# Patient Record
Sex: FEMALE | Race: OTHER | NOT HISPANIC OR LATINO | URBAN - METROPOLITAN AREA
[De-identification: names, ages, dates, MRNs, and addresses within clinical notes are randomized per-mention and may not be internally consistent; named-entity substitution may affect disease eponyms.]

---

## 2018-03-05 ENCOUNTER — OUTPATIENT (OUTPATIENT)
Dept: OUTPATIENT SERVICES | Facility: HOSPITAL | Age: 83
LOS: 1 days | Discharge: HOME | End: 2018-03-05

## 2018-07-16 ENCOUNTER — EMERGENCY (EMERGENCY)
Facility: HOSPITAL | Age: 83
LOS: 1 days | Discharge: HOME | End: 2018-07-16
Attending: EMERGENCY MEDICINE | Admitting: EMERGENCY MEDICINE
Payer: MEDICARE

## 2018-07-16 VITALS
WEIGHT: 80.03 LBS | OXYGEN SATURATION: 97 % | TEMPERATURE: 98 F | HEART RATE: 73 BPM | RESPIRATION RATE: 18 BRPM | DIASTOLIC BLOOD PRESSURE: 86 MMHG | HEIGHT: 59 IN | SYSTOLIC BLOOD PRESSURE: 152 MMHG

## 2018-07-16 PROCEDURE — 99283 EMERGENCY DEPT VISIT LOW MDM: CPT

## 2018-07-16 RX ORDER — ACETAMINOPHEN WITH CODEINE 300MG-30MG
1 TABLET ORAL
Qty: 15 | Refills: 0
Start: 2018-07-16

## 2018-07-16 RX ORDER — ACETAMINOPHEN WITH CODEINE 300MG-30MG
1 TABLET ORAL ONCE
Qty: 0 | Refills: 0 | Status: DISCONTINUED | OUTPATIENT
Start: 2018-07-16 | End: 2018-07-16

## 2018-07-16 RX ADMIN — Medication 1 TABLET(S): at 22:18

## 2018-07-16 NOTE — ED PROVIDER NOTE - OBJECTIVE STATEMENT
82 y/o f presents c/o pain and bleeding from gum after having tooth pulled today.  Pt stating she didn't get any pain medication, didn't take anything over the counter.  Pt has been applying pressure to gum and now has stopped bleeding.  Denies fever, chills, all other ROS negative.

## 2018-07-16 NOTE — ED PROVIDER NOTE - MEDICAL DECISION MAKING DETAILS
82 y/o f pain and bleeding after tooth extraction; no active bleeding in ED, given tylenol #3 with improvement, d/c with rx for breakthrough pain, f/u dentist, return to ED if bleeding continues and unable to stop.

## 2018-07-16 NOTE — ED ADULT NURSE NOTE - OBJECTIVE STATEMENT
Pt presents to ED with c/o gum bleeding and severe pain s/p tooth extraction earlier today. Mild bleeding noted. No fever/chills. VSS.

## 2018-07-16 NOTE — ED PROVIDER NOTE - ATTENDING CONTRIBUTION TO CARE
bleeding now controlled, advised to d/c asa for next few days and f/u with dentist tressa. stable for d/c

## 2018-07-20 DIAGNOSIS — Z88.0 ALLERGY STATUS TO PENICILLIN: ICD-10-CM

## 2018-07-20 DIAGNOSIS — Z79.899 OTHER LONG TERM (CURRENT) DRUG THERAPY: ICD-10-CM

## 2018-07-20 DIAGNOSIS — K06.8 OTHER SPECIFIED DISORDERS OF GINGIVA AND EDENTULOUS ALVEOLAR RIDGE: ICD-10-CM

## 2018-07-20 DIAGNOSIS — Z88.8 ALLERGY STATUS TO OTHER DRUGS, MEDICAMENTS AND BIOLOGICAL SUBSTANCES STATUS: ICD-10-CM

## 2018-07-20 DIAGNOSIS — K13.79 OTHER LESIONS OF ORAL MUCOSA: ICD-10-CM

## 2019-03-05 PROBLEM — Z00.00 ENCOUNTER FOR PREVENTIVE HEALTH EXAMINATION: Status: ACTIVE | Noted: 2019-03-05

## 2019-04-15 ENCOUNTER — APPOINTMENT (OUTPATIENT)
Dept: SURGERY | Facility: CLINIC | Age: 84
End: 2019-04-15
Payer: MEDICARE

## 2019-04-15 VITALS
BODY MASS INDEX: 16.15 KG/M2 | TEMPERATURE: 96.3 F | HEART RATE: 74 BPM | DIASTOLIC BLOOD PRESSURE: 84 MMHG | SYSTOLIC BLOOD PRESSURE: 158 MMHG | WEIGHT: 82.25 LBS | OXYGEN SATURATION: 97 % | HEIGHT: 60 IN

## 2019-04-15 DIAGNOSIS — L72.3 SEBACEOUS CYST: ICD-10-CM

## 2019-04-15 PROCEDURE — 99203 OFFICE O/P NEW LOW 30 MIN: CPT

## 2019-04-16 PROBLEM — L72.3 SEBACEOUS CYST: Status: ACTIVE | Noted: 2019-04-16

## 2019-05-06 ENCOUNTER — APPOINTMENT (OUTPATIENT)
Dept: PULMONOLOGY | Facility: CLINIC | Age: 84
End: 2019-05-06
Payer: MEDICARE

## 2019-05-06 VITALS
BODY MASS INDEX: 15.9 KG/M2 | SYSTOLIC BLOOD PRESSURE: 130 MMHG | HEART RATE: 70 BPM | DIASTOLIC BLOOD PRESSURE: 80 MMHG | HEIGHT: 60 IN | OXYGEN SATURATION: 93 % | WEIGHT: 81 LBS | TEMPERATURE: 98.4 F

## 2019-05-06 PROCEDURE — 99204 OFFICE O/P NEW MOD 45 MIN: CPT

## 2019-05-06 NOTE — PHYSICAL EXAM
[General Appearance - Well Developed] : well developed [Normal Appearance] : normal appearance [Well Groomed] : well groomed [General Appearance - Well Nourished] : well nourished [No Deformities] : no deformities [General Appearance - In No Acute Distress] : no acute distress [Normal Conjunctiva] : the conjunctiva exhibited no abnormalities [Eyelids - No Xanthelasma] : the eyelids demonstrated no xanthelasmas [II] : II [Neck Appearance] : the appearance of the neck was normal [Neck Cervical Mass (___cm)] : no neck mass was observed [Thyroid Nodule] : there were no palpable thyroid nodules [Jugular Venous Distention Increased] : there was no jugular-venous distention [Thyroid Diffuse Enlargement] : the thyroid was not enlarged [Heart Rate And Rhythm] : heart rate and rhythm were normal [Murmurs] : no murmurs present [Heart Sounds] : normal S1 and S2 [Respiration, Rhythm And Depth] : normal respiratory rhythm and effort [Exaggerated Use Of Accessory Muscles For Inspiration] : no accessory muscle use [Auscultation Breath Sounds / Voice Sounds] : lungs were clear to auscultation bilaterally [Abnormal Walk] : normal gait [Cyanosis, Localized] : no localized cyanosis [Gait - Sufficient For Exercise Testing] : the gait was sufficient for exercise testing [Nail Clubbing] : no clubbing of the fingernails [Petechial Hemorrhages (___cm)] : no petechial hemorrhages [Skin Turgor] : normal skin turgor [] : no rash [Skin Color & Pigmentation] : normal skin color and pigmentation [Deep Tendon Reflexes (DTR)] : deep tendon reflexes were 2+ and symmetric [Sensation] : the sensory exam was normal to light touch and pinprick [No Focal Deficits] : no focal deficits [Impaired Insight] : insight and judgment were intact [Oriented To Time, Place, And Person] : oriented to person, place, and time [Affect] : the affect was normal

## 2019-05-09 NOTE — END OF VISIT
[>50% of Time Spent on Counseling for ____] : Greater than 50% of the encounter time was spent on counseling for [unfilled] [Time Spent: ___ minutes] : I have spent [unfilled] minutes of face to face time with the patient [FreeTextEntry3] : Suggest she use zolpidem 2.5 mg HS only as needed, tried to emphasize sleep behavior more important than medication, lack of excessive daytime somnolence suggests sleep is adequate.  Will see as needed, return to primary MD

## 2019-05-09 NOTE — HISTORY OF PRESENT ILLNESS
[FreeTextEntry1] : 05/06/2019 :  EMANULE ESPINOZA is a 83 year former smoker female with PMHx HTN and chronic insomnia who is here for the initial visit.\par She stated that had been taking Ambien 5mg qhs  until about 2 years a go. However, her PCP Dr. Chery is uncomfortable filling her medication and now she is having difficulty initiating sleep. \par \par Sleep Routine:\par \par She goes to bed at 12AM, sleep latency is about 30min-1 hour, she does not wakes up during the night and then he is up at 10-11AM. She does not nap. His ESS is 2/24.\par \par off note:\par She used to smoke 1PPD x >50 years. She is now smoking 5 cigarettes/day. She had PFT 2 years go which was "normal".\par \par She reports 6lb weight loss due to living in walk ups on the 5th floor. Otherwise, she denies cataplexy, RLS, parasomnia, or any other sleep behavioral issues.\par \par \par  \par \par \par

## 2019-09-16 ENCOUNTER — EMERGENCY (EMERGENCY)
Facility: HOSPITAL | Age: 84
LOS: 1 days | Discharge: HOME | End: 2019-09-16
Attending: EMERGENCY MEDICINE | Admitting: EMERGENCY MEDICINE
Payer: MEDICARE

## 2019-09-16 VITALS
OXYGEN SATURATION: 96 % | RESPIRATION RATE: 17 BRPM | TEMPERATURE: 98 F | SYSTOLIC BLOOD PRESSURE: 144 MMHG | DIASTOLIC BLOOD PRESSURE: 78 MMHG | HEART RATE: 71 BPM | WEIGHT: 82.01 LBS | HEIGHT: 60 IN

## 2019-09-16 PROCEDURE — 99283 EMERGENCY DEPT VISIT LOW MDM: CPT

## 2019-09-16 RX ORDER — PERMETHRIN CREAM 5% W/W 50 MG/G
1 CREAM TOPICAL
Qty: 1 | Refills: 0
Start: 2019-09-16

## 2019-09-16 NOTE — ED PROVIDER NOTE - PATIENT PORTAL LINK FT
You can access the FollowMyHealth Patient Portal offered by [facility] by registering at the following website: [email or web address]. By joining [employer]’s FollowMyHealth portal, you will also be able to view your health information using other applications (apps) compatible with our system.

## 2019-09-16 NOTE — ED PROVIDER NOTE - OBJECTIVE STATEMENT
Pt is 85yo pmhx HTN presents with 3 weeks of pruritic rash. Rash began three weeks ago, started on upper back, spread to lower back and BL upper arms. Pt reports no obvious lesions other than excoriations from scratching. Denies pain, purulence, fever/chills, N/V, diarrhea, CP, SOB, no recent travel, no shingles vaccine. Prescribed cetrizine by PCP however did not take "because my daughter told me not to."

## 2019-09-16 NOTE — ED ADULT NURSE NOTE - CHPI ED NUR SYMPTOMS NEG
no purulent drainage/no redness/no blood in mucus/no rectal pain/no drainage/no pain/no vomiting/no fever/no chills/no bleeding at site

## 2019-09-16 NOTE — ED PROVIDER NOTE - NSFOLLOWUPINSTRUCTIONS_ED_ALL_ED_FT
Skin care     Image   Apply moisturizing lotion to your skin as needed. Lotion that contains petroleum jelly is best.  Take medicines or apply medicated creams only as told by your health care provider. This may include:  Corticosteroid cream.  Anti-itch lotions.  Oral antihistamines.  Apply a cool, wet cloth (cool compress) to the affected areas.  Take baths with one of the following:  Epsom salts. You can get these at your local pharmacy or grocery store. Follow the instructions on the packaging.  Baking soda. Pour a small amount into the bath as told by your health care provider.  Colloidal oatmeal. You can get this at your local pharmacy or grocery store. Follow the instructions on the packaging.  Apply baking soda paste to your skin. To make the paste, stir water into a small amount of baking soda until it reaches a paste-like consistency.  Do not scratch your skin.  Do not take hot showers or baths, which can make itching worse. A cool shower may help with itching as long as you apply moisturizing lotion after the shower.  Do not use scented soaps, detergents, perfumes, and cosmetic products. Instead, use gentle, unscented versions of these items.  General instructions     Avoid wearing tight clothes.  Keep a journal to help find out what is causing your itching. Write down:  What you eat and drink.  What cosmetic products you use.  What soaps or detergents you use.  What you wear, including jewelry.  Use a humidifier. This keeps the air moist, which helps to prevent dry skin.  Be aware of any changes in your itchiness.  Contact a health care provider if:  The itching does not go away after several days.  You are unusually thirsty or urinating more than normal.  Your skin tingles or feels numb.  Your skin or the white parts of your eyes turn yellow (jaundice).  You feel weak.  You have any of the following:  Night sweats.  Tiredness (fatigue).  Weight loss.  Abdominal pain.  Summary  Pruritus is an itchy feeling on the skin. One of the most common causes is dry skin, but many different conditions and factors can cause itching.  Apply moisturizing lotion to your skin as needed. Lotion that contains petroleum jelly is best.  Take medicines or apply medicated creams only as told by your health care provider.  Do not take hot showers or baths. Do not use scented soaps, detergents, perfumes, or cosmetic products.  This information is not intended to replace advice given to you by your health care provider. Make sure you discuss any questions you have with your health care provider Apply permethrin to your entire body- except your face- leave it for 8 hours- then repeat in 2 weeks    Scabies, Adult  Scabies is a skin condition that happens when very small insects get under the skin (infestation). This causes a rash and severe itchiness. Scabies can spread from person to person (is contagious). If you get scabies, it is common for others in your household to get scabies too.    With proper treatment, symptoms usually go away in 2–4 weeks. Scabies usually does not cause lasting problems.    What are the causes?  This condition is caused by mites (Sarcoptes scabiei, or human itch mites) that can only be seen with a microscope. The mites get into the top layer of skin and lay eggs. Scabies can spread from person to person through:  Close contact with a person who has scabies.  Contact with infested items, such as towels, bedding, or clothing.  What increases the risk?  This condition is more likely to develop in:  People who live in nursing homes and other extended-care facilities.  People who have sexual contact with a partner who has scabies.  Young children who attend  facilities.  People who care for others who are at increased risk for scabies.  What are the signs or symptoms?  Symptoms of this condition may include:  Severe itchiness. This is often worse at night.  A rash that includes tiny red bumps or blisters. The rash commonly occurs on the wrist, elbow, armpit, fingers, waist, groin, or buttocks. Bumps may form a line (burrow) in some areas.  Skin irritation. This can include scaly patches or sores.  How is this diagnosed?  This condition is diagnosed with a physical exam. Your health care provider will look closely at your skin. In some cases, your health care provider may take a sample of your affected skin (skin scraping) and have it examined under a microscope.    How is this treated?  This condition may be treated with:  Medicated cream or lotion that kills the mites. This is spread on the entire body and left on for several hours. Usually, one treatment with medicated cream or lotion is enough to kill all of the mites. In severe cases, the treatment may be repeated.  Medicated cream that relieves itching.  Medicines that help to relieve itching.  Medicines that kill the mites. This treatment is rarely used.  Follow these instructions at home:  Image   Medicines     Take or apply over-the-counter and prescription medicines as told by your health care provider.  Apply medicated cream or lotion as told by your health care provider.  Do not wash off the medicated cream or lotion until the necessary amount of time has passed.  Skin Care     Avoid scratching your affected skin.  Keep your fingernails closely trimmed to reduce injury from scratching.  Take cool baths or apply cool washcloths to help reduce itching.  General instructions     Clean all items that you recently had contact with, including bedding, clothing, and furniture. Do this on the same day that your treatment starts.  Use hot water when you wash items.  Place unwashable items into closed, airtight plastic bags for at least 3 days. The mites cannot live for more than 3 days away from human skin.  Vacuum furniture and mattresses that you use.  Make sure that other people who may have been infested are examined by a health care provider. These include members of your household and anyone who may have had contact with infested items.  Keep all follow-up visits as told by your health care provider. This is important.  Contact a health care provider if:  You have itching that does not go away after 4 weeks of treatment.  You continue to develop new bumps or burrows.  You have redness, swelling, or pain in your rash area after treatment.  You have fluid, blood, or pus coming from your rash.  This information is not intended to replace advice given to you by your health care provider. Make sure you discuss any questions you have with your health care provider.    Document Released: 09/07/2016 Document Revised: 05/25/2017 Document Reviewed: 07/19/2016  [employer] Patient Education © 2019 [employer]

## 2019-09-16 NOTE — ED PROVIDER NOTE - ATTENDING CONTRIBUTION TO CARE
84 F- hx of HTN- co rash to upper back and back of her arms - itchy- has been present for several weeks  no other people w similar rash  no f/c no n/v  vss  s1s2 lungs cta bl  abd soft nt nd +bs  scab like rash to back, several areas of tiny linear scabs  IMP- Chronic rash- scabies like   rx w permethrin x 2

## 2019-09-16 NOTE — ED PROVIDER NOTE - PHYSICAL EXAMINATION
Constitutional - NAD, Comfortable  HEENT - NCAT, EOMI  Neck - Supple, No limited ROM  Chest - CTA bilaterally, no wheezes/rhonchi/rales  Cardio - RRR, normal S1/S2, no murmurs, rubs, or gallops  Abdomen -  Soft, NTND, normoactive BS, no rebound/guarding  Extremities - No C/C/E, no calf tenderness  Neurologic Exam - A&O x3, CN grossly intact  Skin- +Scattered areas of hyperpigmentation with well-healed excoriations predominantly BL shoulders and central back extending from neck to lumbar region; no clear dermatomal distribution  Psychiatric - Mood stable, Affect WNL

## 2019-09-16 NOTE — ED PROVIDER NOTE - NS ED ROS FT
ROS:  Constitutional: Denies fever/chills  HEENT: Negative  Pulm: Denies shortness of breath  Cardio: Denies chest pain  GI:  Denies abdominal pain, N/V, diarrhea/constipation.   : Negative  Back: +BACK AND UPPER ARM ITCHING  Neuro: Denies headaches, numbness/tingling, dizziness  Skin: Negative  Psych:  Denies depression, anxiety

## 2019-09-16 NOTE — ED ADULT TRIAGE NOTE - CHIEF COMPLAINT QUOTE
Patient complaining of rash to back.  Patient denies any fevers, N/V, new soaps, new lotions, new detergents or any other complaints at this time.

## 2019-09-16 NOTE — ED PROVIDER NOTE - CLINICAL SUMMARY MEDICAL DECISION MAKING FREE TEXT BOX
Pt is 85yo F pmhx htn presents with 3 weeks of pruritic rash. Vitals stable. Exam _. Pt is 85yo F pmhx htn presents with 3 weeks of pruritic rash  scab like in appearance- rx with permethrin x 2

## 2019-09-20 DIAGNOSIS — Z79.891 LONG TERM (CURRENT) USE OF OPIATE ANALGESIC: ICD-10-CM

## 2019-09-20 DIAGNOSIS — R21 RASH AND OTHER NONSPECIFIC SKIN ERUPTION: ICD-10-CM

## 2019-09-20 DIAGNOSIS — Z88.0 ALLERGY STATUS TO PENICILLIN: ICD-10-CM

## 2019-09-20 DIAGNOSIS — Z88.8 ALLERGY STATUS TO OTHER DRUGS, MEDICAMENTS AND BIOLOGICAL SUBSTANCES STATUS: ICD-10-CM

## 2019-09-20 DIAGNOSIS — L29.9 PRURITUS, UNSPECIFIED: ICD-10-CM

## 2019-11-20 ENCOUNTER — EMERGENCY (EMERGENCY)
Facility: HOSPITAL | Age: 84
LOS: 1 days | Discharge: HOME | End: 2019-11-20
Attending: EMERGENCY MEDICINE | Admitting: EMERGENCY MEDICINE
Payer: MEDICARE

## 2019-11-20 VITALS
TEMPERATURE: 98 F | HEART RATE: 80 BPM | DIASTOLIC BLOOD PRESSURE: 76 MMHG | SYSTOLIC BLOOD PRESSURE: 151 MMHG | RESPIRATION RATE: 18 BRPM | OXYGEN SATURATION: 94 %

## 2019-11-20 VITALS
RESPIRATION RATE: 16 BRPM | DIASTOLIC BLOOD PRESSURE: 76 MMHG | HEART RATE: 74 BPM | TEMPERATURE: 98 F | OXYGEN SATURATION: 95 % | SYSTOLIC BLOOD PRESSURE: 146 MMHG

## 2019-11-20 PROBLEM — I10 ESSENTIAL (PRIMARY) HYPERTENSION: Chronic | Status: ACTIVE | Noted: 2019-09-16

## 2019-11-20 PROCEDURE — 99283 EMERGENCY DEPT VISIT LOW MDM: CPT

## 2019-11-20 RX ORDER — CYCLOBENZAPRINE HYDROCHLORIDE 10 MG/1
1 TABLET, FILM COATED ORAL
Qty: 14 | Refills: 0
Start: 2019-11-20 | End: 2019-11-26

## 2019-11-20 NOTE — ED ADULT NURSE NOTE - OBJECTIVE STATEMENT
Patient presents to the ED with neck pain.  She states she has been taking tylenol with improvement.  Denies fevers, chills, HA, neuro deficits.  Patient ambulatory with steady gait.

## 2019-11-20 NOTE — ED PROVIDER NOTE - PATIENT PORTAL LINK FT
You can access the FollowMyHealth Patient Portal offered by [facility] by registering at the following website: [email or web address]. By joining [employer]’s FollowMyHealth portal, you will also be able to view your health information using other applications (apps) compatible with our system.

## 2019-11-20 NOTE — ED PROVIDER NOTE - OBJECTIVE STATEMENT
83 y/o F with PMHx of HTN presenting to the ED with complaints of right sided neck pain, stiffness and tightness x 3 weeks. Pt reports that she woke up 3 weeks ago with the pain and symptoms have since persisted. Pt has been taking Tylenol with mild relief of symptoms, but believes the pain occurs because she has not been sleeping properly and is twisting/turning in her sleep. Pt went to her PCP requesting Ambien who said no, and is therefore presenting today requesting an Ambien prescription. Denies any numbness, tingling, headache, dizziness, or any other acute complaints.

## 2019-11-20 NOTE — ED PROVIDER NOTE - ATTENDING CONTRIBUTION TO CARE
83 y/o F with PMHx of HTN presenting to the ED with complaints of right sided neck pain, stiffness and tightness x 3 weeks. Pt reports that she woke up 3 weeks ago with the pain and symptoms have since persisted. Pt has been taking Tylenol with mild relief of symptoms, but believes the pain occurs because she has not been sleeping properly and is twisting/turning in her sleep. Pt went to her PCP requesting Ambien who said no, and is therefore presenting today requesting an Ambien prescription. Denies any numbness, tingling, headache, dizziness, or any other acute complaints.    Agree with HPI and PE as documented by PA  Pt in no acute distress  Pt with pain upon movement   No focal midline tenderness  Pt requesting ambien but do not recommend  Will give small dose of flexeril  Very well appearing in ED

## 2019-11-20 NOTE — ED PROVIDER NOTE - MUSCULOSKELETAL, MLM
Mild tenderness to right side of neck. No swelling, no skin discoloration, no midline cervical neck tenderness. FROM of head and neck. Strength 5/5 to b/l UE. Distal sensations intact.

## 2019-11-20 NOTE — ED PROVIDER NOTE - CLINICAL SUMMARY MEDICAL DECISION MAKING FREE TEXT BOX
83 y/o F with PMHx of HTN presenting with 3 weeks of right sided neck stiffness and discomfort. Symptoms have been improving although pt is still having some pain, thus presenting to ED. Pt also asking for presciption for Ambien which her PMD wouldn't give her. Pt is well-appearing, no neuro deficits. Likely torticollis, possible muscle spasm. Not concerned for vascular etiology. Will give prescription for Flexeril, pt can continue taking Tylenol. Pt to f/u with PMD for possible PT if symptoms persist. Discussed with pt that she will not be given a prescription for Ambien from the ER.

## 2019-11-20 NOTE — ED PROVIDER NOTE - NSFOLLOWUPINSTRUCTIONS_ED_ALL_ED_FT
Acute Neck Pain    WHAT YOU NEED TO KNOW:    Acute neck pain starts suddenly, increases quickly, and goes away in a few days. The pain may come and go, or be worse with certain movements. The pain may be only in your neck, or it may move to your arms, back, or shoulders. You may also have pain that starts in another body area and moves to your neck. Vertebral Column         DISCHARGE INSTRUCTIONS:    Return to the emergency department if:     You have an injury that causes neck pain and shooting pain down your arms or legs.      Your neck pain suddenly becomes severe.      You have neck pain along with numbness, tingling, or weakness in your arms or legs.      You have a stiff neck, a headache, and a fever.    Contact your healthcare provider if:     You have new or worsening symptoms.      Your symptoms continue even after treatment.      You have questions or concerns about your condition or care.    Medicines:     NSAIDs, such as ibuprofen, help decrease swelling, pain, and fever. This medicine is available without a doctor's order. Ask your healthcare provider which medicine to take and how often to take it. Follow directions. NSAIDs can cause stomach bleeding or kidney problems if not taken correctly. If you take blood thinner medicine, always ask if NSAIDs are safe for you.      Acetaminophen helps decrease pain and fever. Ask your healthcare provider how much to take and how often to take it. Follow directions. Acetaminophen can cause liver damage if not taken correctly.      Steroid medicine may be used to reduce inflammation. This can help relieve pain caused by swelling.      Take your medicine as directed. Contact your healthcare provider if you think your medicine is not helping or if you have side effects. Tell him or her if you are allergic to any medicine. Keep a list of the medicines, vitamins, and herbs you take. Include the amounts, and when and why you take them. Bring the list or the pill bottles to follow-up visits. Carry your medicine list with you in case of an emergency.    Manage or prevent acute neck pain:     Rest your neck as directed. Do not make sudden movements, such as turning your head quickly. Your healthcare provider may recommend you wear a cervical collar for a short time. The collar will prevent you from moving your head. This will give your neck time to heal if an injury is causing your neck pain. Ask your healthcare provider when you can return to sports or other normal daily activities.      Apply heat as directed. Heat helps relieve pain and swelling. Use a heat wrap, or soak a small towel in warm water. Wring out the extra water. Apply the heat wrap or towel for 20 minutes every hour, or as directed.      Apply ice as directed. Ice helps relieve pain and swelling, and can help prevent tissue damage. Use an ice pack, or put ice in a bag. Cover the ice pack or back with a towel before you apply it to your neck. Apply the ice pack or ice for 15 minutes every hour, or as directed. Your healthcare provider can tell you how often to apply ice.      Do neck exercises as directed. Neck exercises help strengthen the muscles and increase range of motion. Your healthcare provider will tell you which exercises are right for you. He may give you instructions, or he may recommend that you work with a physical therapist. Your healthcare provider or therapist can make sure you are doing the exercises correctly.       Maintain good posture. Try to keep your head and shoulders lifted when you sit. If you work in front of a computer, make sure the monitor is at the right level. You should not need to look up down to see the screen. You should also not have to lean forward to be able to read what is on the screen. Make sure your keyboard, mouse, and other computer items are placed where you do not have to extend your shoulder to reach them. Get up often if you work in front of a computer or sit for long periods of time. Stretch or walk around to keep your neck muscles loose.    Follow up with your healthcare provider as directed: Your healthcare provider may refer you to a specialist if your pain does not get better with treatment. Write down your questions so you remember to ask them during your visits.

## 2019-11-20 NOTE — ED ADULT TRIAGE NOTE - CHIEF COMPLAINT QUOTE
pt c/o R sided neck pain for 3 weeks. denies trauma or injury. denies numbness/tingling, headaches, chest pain, sob.

## 2019-11-25 DIAGNOSIS — M54.2 CERVICALGIA: ICD-10-CM

## 2019-11-25 DIAGNOSIS — M43.6 TORTICOLLIS: ICD-10-CM

## 2019-12-09 ENCOUNTER — EMERGENCY (EMERGENCY)
Facility: HOSPITAL | Age: 84
LOS: 1 days | Discharge: HOME | End: 2019-12-09
Attending: EMERGENCY MEDICINE | Admitting: EMERGENCY MEDICINE
Payer: MEDICARE

## 2019-12-09 VITALS
SYSTOLIC BLOOD PRESSURE: 132 MMHG | OXYGEN SATURATION: 95 % | HEIGHT: 60 IN | RESPIRATION RATE: 18 BRPM | WEIGHT: 84 LBS | TEMPERATURE: 98 F | HEART RATE: 80 BPM | DIASTOLIC BLOOD PRESSURE: 73 MMHG

## 2019-12-09 PROCEDURE — 72125 CT NECK SPINE W/O DYE: CPT | Mod: 26

## 2019-12-09 PROCEDURE — 99284 EMERGENCY DEPT VISIT MOD MDM: CPT | Mod: 25

## 2019-12-09 PROCEDURE — 99284 EMERGENCY DEPT VISIT MOD MDM: CPT

## 2019-12-09 PROCEDURE — 72125 CT NECK SPINE W/O DYE: CPT

## 2019-12-09 RX ORDER — ACETAMINOPHEN 500 MG
650 TABLET ORAL ONCE
Refills: 0 | Status: COMPLETED | OUTPATIENT
Start: 2019-12-09 | End: 2019-12-09

## 2019-12-09 RX ADMIN — Medication 650 MILLIGRAM(S): at 16:00

## 2019-12-09 RX ADMIN — Medication 650 MILLIGRAM(S): at 15:30

## 2019-12-09 NOTE — ED PROVIDER NOTE - CARE PROVIDER_API CALL
Dom George)  Internal Medicine  [address] [place] [address]  Phone: [phone]  Fax: [phone]  Follow Up Time:

## 2019-12-09 NOTE — ED PROVIDER NOTE - CPE EDP CARDIAC NORM
From: Gina L Behr  To: Gregoria Grey MD  Sent: 10/16/2017 10:19 AM CDT  Subject: Refill Oketo Thyroid    Hi Dr. Grey,    Endocrinologist was referred to my by my old PCP. You have offered to manage my hypothyroidism in the past. The [facility] specialist has since left [place]. I will be needing a refill on Oketo in about a month. Please let me know if you'd like labs appointment or both. I work at [facility] so labs are easy to do here.     Thank you,    Gina Behr   [phone] and leaving a detailed message on this voicemail is fine.   normal...

## 2019-12-09 NOTE — ED PROVIDER NOTE - MUSCULOSKELETAL NEGATIVE STATEMENT, MLM
Post renal transplant day 0  Plan  Monitor urine out put, electrolytes, blood pressure  reviewed medications, lab data  I was present during and reviewed clinical and lab data as well as assessment and plan as documented by the house staff as noted. Please contact if any additional questions with any change in clinical condition or on availability of any additional information or reports.  Omar Cortes MD  [phone] no back pain, no gout, no musculoskeletal pain, + neck pain, and no weakness.

## 2019-12-09 NOTE — ED PROVIDER NOTE - NEUROLOGICAL, MLM
Alert and oriented, no focal deficits, no motor or sensory deficits.  5/5 strength to bilateral UEs, sensation intact and symmetric to bilateral UEs.

## 2019-12-09 NOTE — ED ADULT NURSE NOTE - OBJECTIVE STATEMENT
Pt presents complaining of neck pain and stiffness since late november. Pt reports she was seen in [facility] ED and was sent home with cyclobenzaprine which helped but she has now run out of pills and does not have a primary care doctor to follow up with for a refill. Pt reports she cannot turn head side to side, elicits tenderness on palpation of neck. Pt reports no fevers/chills, no headache, pt demonstrates ability to touch chin to chest.

## 2019-12-09 NOTE — ED ADULT NURSE NOTE - NSIMPLEMENTINTERV_GEN_ALL_ED
Implemented All Fall with Harm Risk Interventions:  [place] to call system. Call bell, personal items and telephone within reach. Instruct patient to call for assistance. Room bathroom lighting operational. Non-slip footwear when patient is off stretcher. Physically safe environment: no spills, clutter or unnecessary equipment. Stretcher in lowest position, wheels locked, appropriate side rails in place. Provide visual cue, wrist band, yellow gown, etc. Monitor gait and stability. Monitor for mental status changes and reorient to person, place, and time. Review medications for side effects contributing to fall risk. Reinforce activity limits and safety measures with patient and family. Provide visual clues: red socks.

## 2019-12-09 NOTE — ED PROVIDER NOTE - NSFOLLOWUPINSTRUCTIONS_ED_ALL_ED_FT
Please follow up with your primary physician in 1-2 days for re evaluation.  Please return to ER immediately should your symptoms worsen or if you have any concern prior to this recommended follow up.    Cervical Strain    WHAT YOU NEED TO KNOW:    A cervical strain is a stretched or torn muscle or tendon in your neck. Tendons are strong tissues that connect muscles to bones.    DISCHARGE INSTRUCTIONS:    Return to the emergency department if:     You have pain or numbness from your shoulder down to your hand.      You have problems with your vision, hearing, or balance.      You feel confused or cannot concentrate.      You have problems with movement and strength.    Call your doctor if:     You have increased swelling or pain in your neck.       You have questions or concerns about your condition or care.    Medicines: You may need any of the following:     Acetaminophen decreases pain and fever. It is available without a doctor's order. Ask how much to take and how often to take it. Follow directions. Read the labels of all other medicines you are using to see if they also contain acetaminophen, or ask your doctor or pharmacist. Acetaminophen can cause liver damage if not taken correctly. Do not use more than 4 grams (4,000 milligrams) total of acetaminophen in one day.       NSAIDs, such as ibuprofen, help decrease swelling, pain, and fever. This medicine is available with or without a doctor's order. NSAIDs can cause stomach bleeding or kidney problems in certain people. If you take blood thinner medicine, always ask your healthcare provider if NSAIDs are safe for you. Always read the medicine label and follow directions.      Muscle relaxers help decrease pain and muscle spasms.      Prescription pain medicine may be given. Ask your healthcare provider how to take this medicine safely. Some prescription pain medicines contain acetaminophen. Do not take other medicines that contain acetaminophen without talking to your healthcare provider. Too much acetaminophen may cause liver damage. Prescription pain medicine may cause constipation. Ask your healthcare provider how to prevent or treat constipation.       Take your medicine as directed. Contact your healthcare provider if you think your medicine is not helping or if you have side effects. Tell him or her if you are allergic to any medicine. Keep a list of the medicines, vitamins, and herbs you take. Include the amounts, and when and why you take them. Bring the list or the pill bottles to follow-up visits. Carry your medicine list with you in case of an emergency.    Manage your symptoms:     Apply heat on your neck for 15 to 20 minutes, 4 to 6 times a day or as directed. Heat helps decrease pain, stiffness, and muscle spasms.      Begin gentle neck exercises as soon as you can move your neck without pain. Exercises will help decrease stiffness and improve the strength and movement of your neck. Ask your healthcare provider what kind of exercises you should do.      Gradually return to your usual activities as directed. Stop if you have pain. Avoid activities that can cause more damage to your neck, such as heavy lifting or strenuous exercise.      Sleep without a pillow to help decrease pain. Instead, roll a small towel tightly and place it under your neck.       Go to physical therapy as directed. A physical therapist teaches you exercises to help improve movement and strength, and to decrease pain.     Prevent another neck injury:     Drive safely. Make sure everyone in your car wears a seatbelt. A seatbelt can save your life if you are in an accident. Do not use your cell phone when you are driving. This could distract you and cause an accident. Pull over if you need to make a call or send a text message.       Wear helmets, lifejackets, and protective gear. Always wear a helmet when you ride a bike or motorcycle, go skiing, or play sports that could cause a head injury. Wear protective equipment when you play sports. Wear a lifejacket when you are on a boat or doing water sports.     Follow up with your doctor as directed: You may be referred to an orthopedist or physical therapies. Write down your questions so you remember to ask them during your visits.        © Copyright "[employer] 2019       back to top                      © Copyright "[employer] 2019

## 2019-12-09 NOTE — ED PROVIDER NOTE - PATIENT PORTAL LINK FT
You can access the FollowMyHealth Patient Portal offered by [facility] by registering at the following website: [email or web address]. By joining [employer]’s FollowMyHealth portal, you will also be able to view your health information using other applications (apps) compatible with our system.

## 2019-12-09 NOTE — ED ADULT NURSE NOTE - CHPI ED NUR SYMPTOMS NEG
no bowel dysfunction/no motor function loss/no tingling/no anorexia/no fatigue/no difficulty bearing weight/no bladder dysfunction/no numbness/no constipation

## 2019-12-09 NOTE — ED PROVIDER NOTE - CLINICAL SUMMARY MEDICAL DECISION MAKING FREE TEXT BOX
Patient in ED w concern for ongoing neck pain.  Patient well appearing, no midline spine pain on exam, + reproducible ttp over bilateral paracervical musculature.  Given tylenol in ED and CT cervical spine completed and results reviewed.  Patient notes improvement in her pain with tylenol.  She is given name/info for another PCP at her request and is advised to take tylenol and follow up with primary team in 2-3 days for re eval.  She is instructed to return to ED immediately should her symptoms worsen or if she has any concern prior to this recommended follow up.  Patient is aware of plan and verbalizes her understanding.  Will discharge home at this time.

## 2019-12-09 NOTE — ED ADULT TRIAGE NOTE - CHIEF COMPLAINT QUOTE
Pt CO Neck pain and subsequent limited ROM.  Pt states "I was evaluated here on 11/20 and they sent a Rx to the pharmacy but they wouldn't give it to me."

## 2019-12-09 NOTE — ED PROVIDER NOTE - MUSCULOSKELETAL, MLM
Spine appears normal, there is no midline cervical spine pain/tenderness/stepoff/deformity.  range of motion is not limited, no muscle or joint tenderness

## 2019-12-15 DIAGNOSIS — M54.2 CERVICALGIA: ICD-10-CM

## 2020-02-24 ENCOUNTER — EMERGENCY (EMERGENCY)
Facility: HOSPITAL | Age: 85
LOS: 1 days | Discharge: HOME | End: 2020-02-24
Admitting: EMERGENCY MEDICINE
Payer: MEDICARE

## 2020-02-24 VITALS
HEIGHT: 60 IN | SYSTOLIC BLOOD PRESSURE: 112 MMHG | DIASTOLIC BLOOD PRESSURE: 70 MMHG | RESPIRATION RATE: 18 BRPM | WEIGHT: 84 LBS | TEMPERATURE: 98 F | HEART RATE: 87 BPM | OXYGEN SATURATION: 96 %

## 2020-02-24 PROCEDURE — 28490 TREAT BIG TOE FRACTURE: CPT | Mod: 54,T5

## 2020-02-24 PROCEDURE — 28490 TREAT BIG TOE FRACTURE: CPT | Mod: T5

## 2020-02-24 PROCEDURE — 99284 EMERGENCY DEPT VISIT MOD MDM: CPT | Mod: 57

## 2020-02-24 PROCEDURE — 99283 EMERGENCY DEPT VISIT LOW MDM: CPT | Mod: 25

## 2020-02-24 PROCEDURE — 73630 X-RAY EXAM OF FOOT: CPT | Mod: 26,RT

## 2020-02-24 PROCEDURE — 73630 X-RAY EXAM OF FOOT: CPT

## 2020-02-24 RX ORDER — IBUPROFEN 200 MG
600 TABLET ORAL ONCE
Refills: 0 | Status: COMPLETED | OUTPATIENT
Start: 2020-02-24 | End: 2020-02-24

## 2020-02-24 RX ADMIN — Medication 600 MILLIGRAM(S): at 22:12

## 2020-02-24 RX ADMIN — Medication 600 MILLIGRAM(S): at 22:45

## 2020-02-24 NOTE — ED PROVIDER NOTE - CLINICAL SUMMARY MEDICAL DECISION MAKING FREE TEXT BOX
85 y/o female with right toe pain s/p fall x1 week. Consider fx/dislocation. Soft compartment. NVI. no skin breaks. Xray + for fx. Placed preston splint with post op shoe. Advised follow-up with Podiatry for repeat xray in one week to ensure healing.

## 2020-02-24 NOTE — ED PROVIDER NOTE - OBJECTIVE STATEMENT
85 y/o female present with right toe pain x1 week. Pt reports she fell and hit her right toe. She has noticed swelling and bruising however has improved. She has been taking tylenol for the pain. Despite the pain she has been able to walk. She denies the following: numbness/tingling, skin breaks.

## 2020-02-24 NOTE — ED PROVIDER NOTE - NSFOLLOWUPINSTRUCTIONS_ED_ALL_ED_FT
You have been diagnosed with a fracture located on your right toe. For the next 4-8 weeks please continue wrapping your toe with self-adhesive wrap as discussed and wear a post op shoe or a short cam boot. Please follow up with a podiatrist in one week for repeat exam and xray. Return to the Emergency Department if you have any new or worsening symptoms, or if you have any concerns.    Toe Fracture  A toe fracture is a break in one of the toe bones (phalanges). A toe fracture may be:  A crack in the surface of the bone (stress fracture). This often occurs in athletes.A break all the way through the bone (complete fracture).What are the causes?  This condition may be caused by:  Direct impact, such as from dropping a heavy object on your toe.Stubbing your toe.Twisting or stretching your toe out of place.Overuse or repetitive exercise.What increases the risk?  You are more likely to develop this condition if you:  Play contact sports.Have a condition that causes the bones to become thin and brittle (osteoporosis).Have a low calcium level.What are the signs or symptoms?  The main symptoms of this condition are swelling and pain in the toe. Other symptoms include:  Bruising.Stiffness.Numbness.A change in the way the toe looks.Broken bones that poke through the skin.Blood beneath the toenail.How is this diagnosed?  This condition is diagnosed with a physical exam. You may also have X-rays.  How is this treated?  Treatment for this condition depends on the type of fracture and its severity. Treatment may include:  Taping the broken toe to a toe that is next to it (preston taping). This is the most common treatment for fractures in which the bone has not moved out of place (non-displaced fracture).Wearing a shoe that has a wide, rigid sole to protect the toe and to limit its movement.Wearing a walking cast.Having a procedure to move the toe back into place.Surgery. This may be needed if the:  Pieces of broken bone are out of place (displaced).Bone breaks through the skin.Physical therapy. This is done to help regain movement and strength in the toe.You may need follow-up X-rays to make sure that the bone is healing well and staying in position.  Follow these instructions at home:  If you have a shoe:     Wear the shoe as told by your health care provider. Remove it only as told by your health care provider. Loosen the shoe if your toes tingle, become numb, or turn cold and blue.Keep the shoe clean and dry.If you have a cast:     Do not put pressure on any part of the cast until it is fully hardened. This may take several hours.Do not stick anything inside the cast to scratch your skin. Doing that increases your risk of infection.Check the skin around the cast every day. Tell your health care provider about any concerns. You may put lotion on dry skin around the edges of the cast. Do not put lotion on the skin underneath the cast. Keep the cast clean and dry.Bathing     Do not take baths, swim, or use a hot tub until your health care provider approves. Ask your health care provider if you can take showers.If the shoe or cast is not waterproof:  Do not let it get wet. Cover it with a watertight covering when you take a bath or a shower.Activity     Do not use the injured foot to support your body weight until your health care provider says that you can. Use crutches as directed.Ask your health care provider:  What activities are safe for you during recovery.What activities you need to avoid.Do physical therapy exercises as directed.Driving     Do not drive or use heavy machinery while taking pain medicine.Do not drive while wearing a cast on a foot that you use for driving.Managing pain, stiffness, and swelling        If directed, put ice on painful areas:  Put ice in a plastic bag.Place a towel between your skin and the bag.  If you have a shoe, remove it as told by your health care provider.If you have a cast, place a towel between your cast and the bag.Leave the ice on for 20 minutes, 2–3 times per day.Raise (elevate) the injured area above the level of your heart while you are sitting or lying down.General instructions     If your toe was treated with preston taping, follow your health care provider's instructions for changing the gauze and tape. Change it more often if:  The gauze and tape get wet. If this happens, dry the space between the toes.The gauze and tape are too tight and cause your toe to become pale or numb.If you were not given a protective shoe, wear sturdy, supportive shoes. Your shoes should not pinch your toes and should not fit tightly against your toes.Do not use any products that contain nicotine or tobacco, such as cigarettes and e-cigarettes. These can delay bone healing. If you need help quitting, ask your health care provider.Take over-the-counter and prescription medicines only as told by your health care provider.Keep all follow-up visits as told by your health care provider. This is important.Contact a health care provider if you have:  Pain that gets worse or does not get better with medicine.A fever.A bad smell coming from your cast.Get help right away if you have:  Any of the following in your toes or your foot:  Numbness that gets worse.Tingling.Coldness.Blue skin.Redness or swelling that gets worse.Pain that suddenly becomes severe.Summary  A toe fracture is a break in one of the toe bones (phalanges).Treatment depends on how severe your fracture is and how the pieces of the broken bone line up with each other. Treatment may include preston taping, wearing a shoe or a cast, or using crutches.Ice and elevate your foot to help lessen the pain and swelling.This information is not intended to replace advice given to you by your health care provider. Make sure you discuss any questions you have with your health care provider.    Document Released: 12/15/2001 Document Revised: 04/02/2019 Document Reviewed: 01/29/2019  [employer] Interactive Patient Education © 2020 [employer]

## 2020-02-24 NOTE — ED PROVIDER NOTE - LOWER EXTREMITY EXAM, RIGHT
ECCHYMOSIS, TTP ALONG MEDIAL 1ST TOE WITH DECREASED ROM, SENSATION AND GOOD CAP REFILL INTACT WITHOUT SKIN BREAKS/TENDERNESS/BRUISING/SWELLING

## 2020-02-24 NOTE — ED PROVIDER NOTE - PATIENT PORTAL LINK FT
You can access the FollowMyHealth Patient Portal offered by [facility] by registering at the following website: [email or web address]. By joining [facility]’s FollowMyHealth portal, you will also be able to view your health information using other applications (apps) compatible with our system.

## 2020-02-24 NOTE — ED ADULT TRIAGE NOTE - CHIEF COMPLAINT QUOTE
presents to ED for right foot pain s/p fall last Thursday evening while in her home.  patient reports it was a mechanical fall while walking in the dark at home.

## 2020-02-24 NOTE — ED ADULT NURSE NOTE - CHPI ED NUR SYMPTOMS NEG
no deformity/no bleeding/no abrasion/no vomiting/no loss of consciousness/no confusion/no tingling/no weakness/no numbness/no fever

## 2020-02-24 NOTE — ED ADULT TRIAGE NOTE - PAIN: PRESENCE, MLM
PT DAILY TREATMENT NOTE     Patient Name: Ni Mathis  Date:2018  : 1963  [x]  Patient  Verified  Payor: Casandra Christina / Plan[facility] [place] [employer]  [facility] Product Type: Commerical /    In time:3:00  Out time: 3:48  Total Treatment Time (min): 48  Visit #: 5 of     Treatment Area: Pain in right ankle and joints of right foot [M25.571]  Unspecified fracture of shaft of right tibia, initial encounter for closed fracture [S82.201A]    SUBJECTIVE  Pain Level (0-10 scale): 4  Any medication changes, allergies to medications, adverse drug reactions, diagnosis change, or new procedure performed?: [x] No    [] Yes (see summary sheet for update)  Subjective functional status/changes:   [x] No changes reported      OBJECTIVE    28 min Therapeutic Exercise:  [x] See flow sheet :   Rationale: increase ROM and increase strength to improve the patients ability to improve ease of ADLs. 10 min Neuromuscular Re-education:  [x]  See flow sheet :   Rationale: increase strength, improve coordination and increase proprioception  to improve the patients ability to improve stability during ambulatory activity. 10 min Manual Therapy:  R talocrural joint mobilizations to improve DF and PF grade 2-4, PF and DF manual stretch, [place] to R gastroc and achilles    Rationale: decrease pain, increase ROM and increase tissue extensibility to improve functional ankle mobility to improve ease of ambulation          With   [] TE   [] TA   [] neuro   [] other: Patient Education: [x] Review HEP    [] Progressed/Changed HEP based on:   [] positioning   [] body mechanics   [] transfers   [] heat/ice application    [] other:      Other Objective/Functional Measures: increased TM speed slightly    Pain Level (0-10 scale) post treatment: 4    ASSESSMENT/Changes in Function:  Slow progress with PT.      Patient will continue to benefit from skilled PT services to modify and progress therapeutic interventions, address functional mobility deficits, address ROM deficits, address strength deficits, analyze and address soft tissue restrictions and analyze and cue movement patterns to attain remaining goals. []  See Plan of Care  []  See progress note/recertification  []  See Discharge Summary         Progress towards goals / Updated goals:  Short Term Goals: To be accomplished in 2 weeks:                         1. Patient will report performance of HEP at least 2 times per day to facilitate improved outcomes and improved self management. Pt reports compliance with HEP 2/12/2018                         2. Pt will demonstrate PROM R ankle DF to 5 degrees or better to improve gait mechanics. Not met -1 degrees from neutral 2/12/2018  Long Term Goals: To be accomplished in 6 weeks:                         1. Pt will demonstrate R ankle AROM DF to 8 degrees, Inv to 15 degrees, and PF to 45 or better to improve gait mechanics and ease of functional mobility.                        0. Pt will demonstrate gait void of compensatory hip movement to reduce hip discomfort with ADLs. Not met 2/12/2018                         3. Pt will demonstrate 4/5 global R ankle strength to improve ease of ADLs.                          3. Pt will demonstrate ability to perform eccentric step down on 4 inch step void of compensation to indicate improving functional utilization of ankle mobility during daily     PLAN  []  Upgrade activities as tolerated     [x]  Continue plan of care  []  Update interventions per flow sheet       []  Discharge due to:_  []  Other:_      Tg Singh, PT  2/16/2018  3:05 PM    Future Appointments  Date Time Provider Monique Bullard   2[facility]16/2018 3:00 PM [facility]   2/19/2018 12:00 PM [facility]   2/20/2018 1:40 PM MD Lorrie Krishnamurthy   2/26/2018 5:30 PM [facility]   3/2/2018 2:00 PM [facility]zymanski [facility] complains of pain/discomfort

## 2020-02-29 DIAGNOSIS — S90.111A CONTUSION OF RIGHT GREAT TOE WITHOUT DAMAGE TO NAIL, INITIAL ENCOUNTER: ICD-10-CM

## 2020-02-29 DIAGNOSIS — M79.671 PAIN IN RIGHT FOOT: ICD-10-CM
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
## 2020-02-29 DIAGNOSIS — S92.411A DISPLACED FRACTURE OF PROXIMAL PHALANX OF RIGHT GREAT TOE, INITIAL ENCOUNTER FOR CLOSED FRACTURE: ICD-10-CM

## 2020-02-29 DIAGNOSIS — Z88.0 ALLERGY STATUS TO PENICILLIN: ICD-10-CM

## 2020-02-29 DIAGNOSIS — Z88.6 ALLERGY STATUS TO ANALGESIC AGENT: ICD-10-CM
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
## 2020-04-08 ENCOUNTER — EMERGENCY (EMERGENCY)
Facility: HOSPITAL | Age: 85
LOS: 1 days | Discharge: HOME | End: 2020-04-08
Attending: EMERGENCY MEDICINE | Admitting: EMERGENCY MEDICINE
Payer: MEDICARE

## 2020-04-08 VITALS
RESPIRATION RATE: 24 BRPM | HEART RATE: 59 BPM | DIASTOLIC BLOOD PRESSURE: 70 MMHG | SYSTOLIC BLOOD PRESSURE: 129 MMHG | OXYGEN SATURATION: 99 % | TEMPERATURE: 98 F

## 2020-04-08 VITALS
HEART RATE: 75 BPM | TEMPERATURE: 98 F | DIASTOLIC BLOOD PRESSURE: 69 MMHG | RESPIRATION RATE: 26 BRPM | SYSTOLIC BLOOD PRESSURE: 117 MMHG | OXYGEN SATURATION: 94 %

## 2020-04-08 DIAGNOSIS — Z98.890 OTHER SPECIFIED POSTPROCEDURAL STATES: Chronic | ICD-10-CM

## 2020-04-08 LAB
ALBUMIN SERPL ELPH-MCNC: 3.6 G/DL — SIGNIFICANT CHANGE UP (ref 3.3–5)
ALP SERPL-CCNC: 89 U/L — SIGNIFICANT CHANGE UP (ref 40–120)
ALT FLD-CCNC: 18 U/L — SIGNIFICANT CHANGE UP (ref 10–45)
ANION GAP SERPL CALC-SCNC: 14 MMOL/L — SIGNIFICANT CHANGE UP (ref 5–17)
APTT BLD: 29.1 SEC — SIGNIFICANT CHANGE UP (ref 27.5–36.3)
AST SERPL-CCNC: 32 U/L — SIGNIFICANT CHANGE UP (ref 10–40)
BASOPHILS # BLD AUTO: 0 K/UL — SIGNIFICANT CHANGE UP (ref 0–0.2)
BASOPHILS NFR BLD AUTO: 0 % — SIGNIFICANT CHANGE UP (ref 0–2)
BILIRUB SERPL-MCNC: 0.3 MG/DL — SIGNIFICANT CHANGE UP (ref 0.2–1.2)
BUN SERPL-MCNC: 25 MG/DL — HIGH (ref 7–23)
CALCIUM SERPL-MCNC: 9 MG/DL — SIGNIFICANT CHANGE UP (ref 8.4–10.5)
CHLORIDE SERPL-SCNC: 106 MMOL/L — SIGNIFICANT CHANGE UP (ref 96–108)
CO2 SERPL-SCNC: 26 MMOL/L — SIGNIFICANT CHANGE UP (ref 22–31)
CREAT SERPL-MCNC: 0.74 MG/DL — SIGNIFICANT CHANGE UP (ref 0.5–1.3)
DACRYOCYTES BLD QL SMEAR: SLIGHT — SIGNIFICANT CHANGE UP
ELLIPTOCYTES BLD QL SMEAR: SLIGHT — SIGNIFICANT CHANGE UP
EOSINOPHIL # BLD AUTO: 0 K/UL — SIGNIFICANT CHANGE UP (ref 0–0.5)
EOSINOPHIL NFR BLD AUTO: 0 % — SIGNIFICANT CHANGE UP (ref 0–6)
GIANT PLATELETS BLD QL SMEAR: PRESENT — SIGNIFICANT CHANGE UP
GLUCOSE SERPL-MCNC: 88 MG/DL — SIGNIFICANT CHANGE UP (ref 70–99)
HCT VFR BLD CALC: 41.1 % — SIGNIFICANT CHANGE UP (ref 34.5–45)
HGB BLD-MCNC: 13.1 G/DL — SIGNIFICANT CHANGE UP (ref 11.5–15.5)
INR BLD: 1.04 — SIGNIFICANT CHANGE UP (ref 0.88–1.16)
LYMPHOCYTES # BLD AUTO: 0.46 K/UL — LOW (ref 1–3.3)
LYMPHOCYTES # BLD AUTO: 9.7 % — LOW (ref 13–44)
MANUAL SMEAR VERIFICATION: SIGNIFICANT CHANGE UP
MCHC RBC-ENTMCNC: 28.3 PG — SIGNIFICANT CHANGE UP (ref 27–34)
MCHC RBC-ENTMCNC: 31.9 GM/DL — LOW (ref 32–36)
MCV RBC AUTO: 88.8 FL — SIGNIFICANT CHANGE UP (ref 80–100)
MONOCYTES # BLD AUTO: 0.38 K/UL — SIGNIFICANT CHANGE UP (ref 0–0.9)
MONOCYTES NFR BLD AUTO: 8 % — SIGNIFICANT CHANGE UP (ref 2–14)
NEUTROPHILS # BLD AUTO: 3.75 K/UL — SIGNIFICANT CHANGE UP (ref 1.8–7.4)
NEUTROPHILS NFR BLD AUTO: 77 % — SIGNIFICANT CHANGE UP (ref 43–77)
NEUTS BAND # BLD: 1.8 % — SIGNIFICANT CHANGE UP (ref 0–8)
OVALOCYTES BLD QL SMEAR: SLIGHT — SIGNIFICANT CHANGE UP
PLAT MORPH BLD: NORMAL — SIGNIFICANT CHANGE UP
PLATELET # BLD AUTO: 237 K/UL — SIGNIFICANT CHANGE UP (ref 150–400)
POTASSIUM SERPL-MCNC: 4.2 MMOL/L — SIGNIFICANT CHANGE UP (ref 3.5–5.3)
POTASSIUM SERPL-SCNC: 4.2 MMOL/L — SIGNIFICANT CHANGE UP (ref 3.5–5.3)
PROT SERPL-MCNC: 7.2 G/DL — SIGNIFICANT CHANGE UP (ref 6–8.3)
PROTHROM AB SERPL-ACNC: 11.9 SEC — SIGNIFICANT CHANGE UP (ref 10–12.9)
RBC # BLD: 4.63 M/UL — SIGNIFICANT CHANGE UP (ref 3.8–5.2)
RBC # FLD: 13.6 % — SIGNIFICANT CHANGE UP (ref 10.3–14.5)
RBC BLD AUTO: ABNORMAL
SARS-COV-2 RNA SPEC QL NAA+PROBE: SIGNIFICANT CHANGE UP
SODIUM SERPL-SCNC: 146 MMOL/L — HIGH (ref 135–145)
VARIANT LYMPHS # BLD: 3.5 % — SIGNIFICANT CHANGE UP (ref 0–6)
WBC # BLD: 4.76 K/UL — SIGNIFICANT CHANGE UP (ref 3.8–10.5)
WBC # FLD AUTO: 4.76 K/UL — SIGNIFICANT CHANGE UP (ref 3.8–10.5)

## 2020-04-08 PROCEDURE — 71045 X-RAY EXAM CHEST 1 VIEW: CPT | Mod: 26

## 2020-04-08 PROCEDURE — 71045 X-RAY EXAM CHEST 1 VIEW: CPT

## 2020-04-08 PROCEDURE — 85730 THROMBOPLASTIN TIME PARTIAL: CPT

## 2020-04-08 PROCEDURE — 99283 EMERGENCY DEPT VISIT LOW MDM: CPT

## 2020-04-08 PROCEDURE — 85610 PROTHROMBIN TIME: CPT

## 2020-04-08 PROCEDURE — 36415 COLL VENOUS BLD VENIPUNCTURE: CPT

## 2020-04-08 PROCEDURE — 85025 COMPLETE CBC W/AUTO DIFF WBC: CPT

## 2020-04-08 PROCEDURE — 80053 COMPREHEN METABOLIC PANEL: CPT

## 2020-04-08 PROCEDURE — 99284 EMERGENCY DEPT VISIT MOD MDM: CPT | Mod: 25

## 2020-04-08 PROCEDURE — 87635 SARS-COV-2 COVID-19 AMP PRB: CPT

## 2020-04-08 RX ORDER — CHLORHEXIDINE GLUCONATE 213 G/1000ML
15 SOLUTION TOPICAL
Qty: 500 | Refills: 0
Start: 2020-04-08 | End: 2020-04-21

## 2020-04-08 NOTE — ED PROVIDER NOTE - DIAGNOSTIC INTERPRETATION
ER Physician:  Ana Director  CHEST XRAY INTERPRETATION: lungs clear, heart shadow normal, bony structures intact

## 2020-04-08 NOTE — ED PROVIDER NOTE - ENMT, MLM
Airway patent, Nasal mucosa clear. Mouth with normal mucosa. Throat has no vesicles, no oropharyngeal exudates and uvula is midline. Gingival erythema w/o ttp, bleeding.  Missing many teeth.

## 2020-04-08 NOTE — ED PROVIDER NOTE - CPE EDP CARDIAC NORM
12/11/19 1131   Child Life   Location Med/Surg   Intervention Initial Assessment;Supportive Check In  (normalization activities)   Anxiety Appropriate   Techniques to [place] with Loss/Stress/Change diversional activity;family presence   Special Interests television, coloring   Outcomes/Follow Up Continue to Follow/Support     CCLS introduced self and services to pt and pt's mother at bedside in inpatient unit. Pt appeared engaged in television show during CCLS's interaction. Pt requested coloring pages, and toy cars were also provided for normalization of environment as well as developmental support (since pt prefers to watch tv and tablets). Pt's mother said that pt's 8-year-old brother has visited and is coping well with sibling being in hospital. Sibling has visited hospitals in the past and asks appropriate questions. No further needs were assessed at this time. CCLS will continue to follow pt and family as needed.    Rohini Rondon MS, CCLS   normal...

## 2020-04-08 NOTE — ED ADULT NURSE NOTE - NSIMPLEMENTINTERV_GEN_ALL_ED
Implemented All Fall Risk Interventions:  [place] to call system. Call bell, personal items and telephone within reach. Instruct patient to call for assistance. Room bathroom lighting operational. Non-slip footwear when patient is off stretcher. Physically safe environment: no spills, clutter or unnecessary equipment. Stretcher in lowest position, wheels locked, appropriate side rails in place. Provide visual cue, wrist band, yellow gown, etc. Monitor gait and stability. Monitor for mental status changes and reorient to person, place, and time. Review medications for side effects contributing to fall risk. Reinforce activity limits and safety measures with patient and family.

## 2020-04-08 NOTE — ED PROVIDER NOTE - PATIENT PORTAL LINK FT
You can access the FollowMyHealth Patient Portal offered by [facility] by registering at the following website: [email or web address]. By joining [employer]’s FollowMyHealth portal, you will also be able to view your health information using other applications (apps) compatible with our system.

## 2020-04-08 NOTE — ED PROVIDER NOTE - PROGRESS NOTE DETAILS
Labs wnl, cxr read by rad as neg.  Covid pending.  The patient is non toxic appearing with no focal lung findings and acceptable oxygenation.  No increased wob or resp distress.  No further eval or treatment indicated at this time.  Supportive care including increased fluids and over the counter therapy was advised and discussed.  Pt counseled to quarantine x 7 d or until sx free x 72, whichever is longer unless COVID neg.  Typical COVID course discussed; pt counseled to return for worsening ha, neck stiffness, cp, sob, fever, any other concerns.  Hand hygiene and isolation reviewed.

## 2020-04-08 NOTE — ED PROVIDER NOTE - NSFOLLOWUPINSTRUCTIONS_ED_ALL_ED_FT
Gingivitis  Viral syndrome, possible covid infection    Try peridex rinses for your gums - 3 tablespoons swish and spit out twice a day.   Follow up with a dentist.  Return for increased gum bleeding, pain, any other concerns.    We are concerned you may have COVID.  Rest.  Drink plenty of fluids.  Try over the counter medications based on your symptoms for relies; use as directed: sudafed or similar and/or nasal sprays for congestion, robitussin or similar for cough, tylenol for fever, body aches, pain.     Return for increased pain, increased sob, change in cough/sputum, increased fever, intractable vomiting, any other concerns.     ** Self-isolate for 7 days from the onset of your symptoms or until you are symptom free for 72 hours, whichever is longer UNLESS your COVID test is negative.  Cough/sneeze into your elbow area.  Wash your hands after touching your face and avoid touching your face if able; if you are sharing spaces, clean surfaces after you touch them.  ----  Gingivitis  Gingivitis is inflammation of the gums. It can cause redness, soreness, bleeding, and swelling of the gums. This condition is usually mild and clears up with treatment. However, without treatment and proper oral hygiene, gingivitis can get worse and lead to other problems with the teeth and gums.  What are the causes?  This condition is usually caused by a buildup of a sticky substance called plaque.  Plaque is made up of mucus, bacteria, and food particles.When plaque builds up, it reacts with the saliva in the mouth to form a hard deposit called tartar, which becomes trapped around the base of the tooth.Plaque and tartar cause irritation of the gums that leads to gingivitis.Gingivitis usually results from poor care and cleaning of the mouth and teeth (oral hygiene).  What increases the risk?  The following factors may make you more likely to develop this condition:  Not practicing good oral hygiene.Eating a diet that does not provide proper nutrition.Taking certain medicines.Being pregnant.Going through puberty or menopause.Using tobacco products.Having certain medical conditions, such as:  Diabetes.Some viral or fungal infections.Dry mouth.Wearing dental appliances that do not fit properly.What are the signs or symptoms?  Symptoms of this condition include:  Gums that bleed easily, especially during flossing or brushing.Swollen gums.Gums that are bright red or purple.Receding gums. This means that the gums are wearing away from the teeth so that more of the tooth is exposed.Bad breath.How is this diagnosed?  This condition is diagnosed with a medical history and a physical exam of the teeth and gums. You may have X-rays to see if the inflammation has spread to the supporting structures of the teeth.  How is this treated?  This condition may be treated by:  Having your teeth and gums cleaned at the dentist's office to have the plaque and tartar removed.Practicing good oral hygiene at home. This includes careful brushing and regular flossing.Using a mouthwash. In some cases, a mouthwash may be prescribed or recommended.In more advanced cases, this condition may be treated with antibiotic medicine or surgery.  Follow these instructions at home:      Follow instructions from your dentist about how to clean your teeth. Make sure you:  Brush your teeth two times a day using a soft-bristled toothbrush.Floss at least one time each day. It is best to floss before you brush your teeth.Use a mouthwash as told by your dentist.Maintain a well-balanced diet. Between meals, limit your intake of foods and beverages that contain sugar.See a dentist on a regular basis for cleaning and checkups.Do not use any products that contain nicotine or tobacco, such as cigarettes, e-cigarettes, and chewing tobacco. If you need help quitting, ask your health care provider.If you were prescribed an antibiotic medicine, take it as told by your dentist. Do not stop using the antibiotic even if you start to feel better.Keep all follow-up visits as told by your dentist. This is important.Contact a health care provider if you:  Have a fever.Have a lot of bleeding from your gums.Have pain in your gums or teeth.Have difficulty chewing.Notice any loose or infected teeth.Have swollen glands in your face or neck.Summary  Gingivitis is inflammation of the gums. It can cause redness, soreness, bleeding, and swelling of the gums. This condition is usually mild and clears up with treatment.Follow instructions from your dentist about how to clean your teeth.Use a mouthwash as told by your dentist.Contact a health care provider if you have new or worsening symptoms.Keep all follow-up visits as told by your dentist. This is important.

## 2020-04-08 NOTE — ED PROVIDER NOTE - CLINICAL SUMMARY MEDICAL DECISION MAKING FREE TEXT BOX
Pt c/o bleeding gums w/o bleeding on exam - ? gingivitis vs bleeding d/o since pt also notes wt loss.  Pt also notes recent fever/cough that improved since the weekend but noted to have mild tachypnea in triage.  Resp effort appears comfortable on exam; sat 94% ra.  ? covid vs other uri.  Plan labs, cxr, covid, reassess.

## 2020-04-08 NOTE — ED ADULT NURSE NOTE - OBJECTIVE STATEMENT
Pt is an 84y male, presented to ED w/ c/o "bleeding gums." Pt states current smoker, "I have not been able to smoke for a week because I cant breath."   Pt denies fever/chills/CP/cough.  Pt states takes aspirin daily

## 2020-04-08 NOTE — ED PROVIDER NOTE - OBJECTIVE STATEMENT
85 yo female h/o htn c/o bleeding gums intermittently x 2 wk.  Pt notes wt loss since Feb.  No night sweats.  No blood thinners, other bleeding/bruising. No black/bloody stools.  Pt also notes fever ~ 100 over the weekend 4 d ago and dry cough that is resolved.  No uri sx, cp, sob, palpitations, abd pain, n/v/d.  No sick contacts, travel.  Pt lives alone but son helps at home.  No fever since the weekend.  Pt denies gingival or dental pain.

## 2020-04-08 NOTE — ED ADULT TRIAGE NOTE - CHIEF COMPLAINT QUOTE
Bleeding from gums x4days.  Pt denies sob, cough, cold like symptoms, n/v/d/f, abd pain but appears labored when speaking.

## 2020-04-12 DIAGNOSIS — B34.9 VIRAL INFECTION, UNSPECIFIED: ICD-10-CM

## 2020-04-12 DIAGNOSIS — K05.10 CHRONIC GINGIVITIS, PLAQUE INDUCED: ICD-10-CM

## 2020-04-12 DIAGNOSIS — Z88.0 ALLERGY STATUS TO PENICILLIN: ICD-10-CM

## 2020-04-12 DIAGNOSIS — Z87.891 PERSONAL HISTORY OF NICOTINE DEPENDENCE: ICD-10-CM

## 2020-04-12 DIAGNOSIS — Z88.6 ALLERGY STATUS TO ANALGESIC AGENT: ICD-10-CM

## 2020-04-12 DIAGNOSIS — K06.9 DISORDER OF GINGIVA AND EDENTULOUS ALVEOLAR RIDGE, UNSPECIFIED: ICD-10-CM

## 2020-06-23 ENCOUNTER — EMERGENCY (EMERGENCY)
Facility: HOSPITAL | Age: 85
LOS: 1 days | Discharge: HOME | End: 2020-06-23
Attending: EMERGENCY MEDICINE | Admitting: EMERGENCY MEDICINE
Payer: MEDICARE

## 2020-06-23 VITALS
OXYGEN SATURATION: 94 % | TEMPERATURE: 98 F | WEIGHT: 80.03 LBS | DIASTOLIC BLOOD PRESSURE: 71 MMHG | RESPIRATION RATE: 18 BRPM | HEART RATE: 89 BPM | HEIGHT: 57 IN | SYSTOLIC BLOOD PRESSURE: 107 MMHG

## 2020-06-23 DIAGNOSIS — Z98.890 OTHER SPECIFIED POSTPROCEDURAL STATES: Chronic | ICD-10-CM

## 2020-06-23 PROCEDURE — 99283 EMERGENCY DEPT VISIT LOW MDM: CPT

## 2020-06-23 PROCEDURE — 99282 EMERGENCY DEPT VISIT SF MDM: CPT

## 2020-06-23 NOTE — ED PROVIDER NOTE - OBJECTIVE STATEMENT
86yo female with pmhx of HTN presents with itchy lesions on R cheek and forearm x1 week. Reports only known potential irritant is a new olive oil based hair gel. She denies new detergents, medications or foods. Denies viral symptoms, generalized itching, oral swelling, difficulty swallowing, wheezing, shortness of breath. Denies h/o anaphylaxis. Only known allergy to penicillin. She reports h/o scabies so used permethrin she had from previous visit today with mild relief.

## 2020-06-23 NOTE — ED PROVIDER NOTE - ATTENDING CONTRIBUTION TO CARE
86 yo HTN p/w scattered R cheek and R forearm lesions pruritic with new potential trigger of hair oil.  Had scabies a year ago and used left over Permethrin today.  No viral symptoms.  Slight pustular rash scattered to areas above.  DDx includes contact dermatitis, moluskum although less likely.  Do not suspect zoster.  Plan avoid contacts, give fu with derm and outpt evals.

## 2020-06-23 NOTE — ED ADULT TRIAGE NOTE - CCCP TRG CHIEF CMPLNT
PA Initiation    Medication: Dexcom G6 Sensors -   Insurance Company: [employer] - Phone [phone] Fax [phone]  Pharmacy Filling the Rx: [id number] - [place] [id number] [facility] AT [address]  Filling Pharmacy Phone: [phone]  Filling Pharmacy Fax: [phone]  Start Date: 3/20/2020        
Prior Authorization Approval    Medication: Dexcom G6 Sensors - APPROVED was approved on 3/20/2020  Effective: 2/19/2020 to 3/20/2023  Reference #:    Approved Dose/Quantity:   Insurance Company: [employer] - Phone [phone] Fax [phone]  Expected CoPay:    Pharmacy Filling the Rx: [id number] - [place], [place] - [address] [place] [address] AT [address]  Pharmacy Notified: Yes  Patient Notified: Comment:  **Instructed pharmacy to notify patient when script is ready to /ship.**      
medical evaluation

## 2020-06-23 NOTE — ED PROVIDER NOTE - PHYSICAL EXAMINATION
VITAL SIGNS: I have reviewed nursing notes and confirm.  CONSTITUTIONAL: Well-developed; well-nourished; in no acute distress.   SKIN:  warm and dry, scattered pustular appearing lesions along right cheek with solitary lesion on volar aspect of right forearm that are non-ttp without surrounding erythema.   HEAD:  normocephalic, atraumatic.  EYES: PERRL, EOM intact; conjunctiva and sclera clear.  ENT: No nasal discharge; airway clear. No oral swelling. Managing secretions appropriately.   NECK: Supple; non tender.  RESP:  Clear to auscultation b/l, no wheezes, rales or rhonchi.  EXT: Normal ROM. No clubbing, cyanosis or edema. 2+ pulses to b/l ue/le.  NEURO: Alert, oriented, grossly unremarkable  PSYCH: Cooperative, mood and affect appropriate.
03-Aug-2019 03:59

## 2020-06-23 NOTE — ED PROVIDER NOTE - PATIENT PORTAL LINK FT
You can access the FollowMyHealth Patient Portal offered by [facility] by registering at the following website: [email or web address]. By joining [employer]’s FollowMyHealth portal, you will also be able to view your health information using other applications (apps) compatible with our system.

## 2020-06-23 NOTE — ED PROVIDER NOTE - NSFOLLOWUPINSTRUCTIONS_ED_ALL_ED_FT
Your rash may be related to your new hair oil. Avoid using the oil to see if this improves your symptoms.  Wash your face with gentle, fragrance-free products. Avoid make up until your rash resolves.  Wash your mask with fragrance-free soap or detergent.  Follow up with a dermatologist if symptoms persist.

## 2020-06-23 NOTE — ED PROVIDER NOTE - CLINICAL SUMMARY MEDICAL DECISION MAKING FREE TEXT BOX
ED course unremarkable - afebrile and hemodynamically stable. No evidence of anaphylaxis. Suspect irritant contact dermatitis secondary to new hair oil vs. face mask. Encouraged to eliminate potential triggers and use hypoallergenic products on the face until resolution. May take low dose benadryl for symptoms. Will f/u with Dermatology. Return precautions given.

## 2020-06-23 NOTE — ED ADULT NURSE NOTE - OBJECTIVE STATEMENT
Pt presents with urticaria to the palms. Pt reports recently starting to use a new hair oil. Pt denies fevers/chills, denies wounds, denies cough/SOB.

## 2020-06-23 NOTE — ED PROVIDER NOTE - NS ED ROS FT
Constitutional: No fever. No chills.  Eyes: No redness. No discharge. No vision change.   ENT: No sore throat. No ear pain.  Cardiovascular: No chest pain. No leg swelling.  Respiratory: No cough. No shortness of breath.  MSK: No joint pain. No back pain.   Skin: +rash. No abrasions.   Neuro: No numbness. No weakness.   Psych: No known mental health issues.

## 2020-06-27 DIAGNOSIS — Z88.8 ALLERGY STATUS TO OTHER DRUGS, MEDICAMENTS AND BIOLOGICAL SUBSTANCES STATUS: ICD-10-CM

## 2020-06-27 DIAGNOSIS — L29.9 PRURITUS, UNSPECIFIED: ICD-10-CM

## 2020-06-27 DIAGNOSIS — Z88.0 ALLERGY STATUS TO PENICILLIN: ICD-10-CM

## 2020-06-27 DIAGNOSIS — L24.1 IRRITANT CONTACT DERMATITIS DUE TO OILS AND GREASES: ICD-10-CM

## 2020-08-15 ENCOUNTER — EMERGENCY (EMERGENCY)
Facility: HOSPITAL | Age: 85
LOS: 1 days | Discharge: HOME | End: 2020-08-15
Attending: EMERGENCY MEDICINE | Admitting: EMERGENCY MEDICINE
Payer: MEDICARE

## 2020-08-15 VITALS
TEMPERATURE: 98 F | RESPIRATION RATE: 18 BRPM | SYSTOLIC BLOOD PRESSURE: 116 MMHG | DIASTOLIC BLOOD PRESSURE: 80 MMHG | HEART RATE: 94 BPM | HEIGHT: 59 IN | OXYGEN SATURATION: 97 % | WEIGHT: 74.96 LBS

## 2020-08-15 VITALS
TEMPERATURE: 98 F | RESPIRATION RATE: 18 BRPM | SYSTOLIC BLOOD PRESSURE: 147 MMHG | HEART RATE: 82 BPM | OXYGEN SATURATION: 98 % | DIASTOLIC BLOOD PRESSURE: 89 MMHG

## 2020-08-15 DIAGNOSIS — R42 DIZZINESS AND GIDDINESS: ICD-10-CM

## 2020-08-15 DIAGNOSIS — Z79.899 OTHER LONG TERM (CURRENT) DRUG THERAPY: ICD-10-CM

## 2020-08-15 DIAGNOSIS — Z88.8 ALLERGY STATUS TO OTHER DRUGS, MEDICAMENTS AND BIOLOGICAL SUBSTANCES: ICD-10-CM

## 2020-08-15 DIAGNOSIS — Z88.0 ALLERGY STATUS TO PENICILLIN: ICD-10-CM

## 2020-08-15 DIAGNOSIS — R10.13 EPIGASTRIC PAIN: ICD-10-CM

## 2020-08-15 DIAGNOSIS — Z98.890 OTHER SPECIFIED POSTPROCEDURAL STATES: Chronic | ICD-10-CM

## 2020-08-15 DIAGNOSIS — I10 ESSENTIAL (PRIMARY) HYPERTENSION: ICD-10-CM

## 2020-08-15 LAB
ALBUMIN SERPL ELPH-MCNC: 4.1 G/DL — SIGNIFICANT CHANGE UP (ref 3.3–5)
ALP SERPL-CCNC: 88 U/L — SIGNIFICANT CHANGE UP (ref 40–120)
ALT FLD-CCNC: 24 U/L — SIGNIFICANT CHANGE UP (ref 10–45)
ANION GAP SERPL CALC-SCNC: 11 MMOL/L — SIGNIFICANT CHANGE UP (ref 5–17)
AST SERPL-CCNC: 28 U/L — SIGNIFICANT CHANGE UP (ref 10–40)
BASOPHILS # BLD AUTO: 0.02 K/UL — SIGNIFICANT CHANGE UP (ref 0–0.2)
BASOPHILS NFR BLD AUTO: 0.4 % — SIGNIFICANT CHANGE UP (ref 0–2)
BILIRUB SERPL-MCNC: 0.4 MG/DL — SIGNIFICANT CHANGE UP (ref 0.2–1.2)
BUN SERPL-MCNC: 28 MG/DL — HIGH (ref 7–23)
CALCIUM SERPL-MCNC: 9.5 MG/DL — SIGNIFICANT CHANGE UP (ref 8.4–10.5)
CHLORIDE SERPL-SCNC: 104 MMOL/L — SIGNIFICANT CHANGE UP (ref 96–108)
CO2 SERPL-SCNC: 26 MMOL/L — SIGNIFICANT CHANGE UP (ref 22–31)
CREAT SERPL-MCNC: 0.61 MG/DL — SIGNIFICANT CHANGE UP (ref 0.5–1.3)
EOSINOPHIL # BLD AUTO: 0.04 K/UL — SIGNIFICANT CHANGE UP (ref 0–0.5)
EOSINOPHIL NFR BLD AUTO: 0.7 % — SIGNIFICANT CHANGE UP (ref 0–6)
GLUCOSE SERPL-MCNC: 94 MG/DL — SIGNIFICANT CHANGE UP (ref 70–99)
HCT VFR BLD CALC: 41.4 % — SIGNIFICANT CHANGE UP (ref 34.5–45)
HGB BLD-MCNC: 13.3 G/DL — SIGNIFICANT CHANGE UP (ref 11.5–15.5)
IMM GRANULOCYTES NFR BLD AUTO: 0.2 % — SIGNIFICANT CHANGE UP (ref 0–1.5)
LYMPHOCYTES # BLD AUTO: 1.72 K/UL — SIGNIFICANT CHANGE UP (ref 1–3.3)
LYMPHOCYTES # BLD AUTO: 30.2 % — SIGNIFICANT CHANGE UP (ref 13–44)
MCHC RBC-ENTMCNC: 28.6 PG — SIGNIFICANT CHANGE UP (ref 27–34)
MCHC RBC-ENTMCNC: 32.1 GM/DL — SIGNIFICANT CHANGE UP (ref 32–36)
MCV RBC AUTO: 89 FL — SIGNIFICANT CHANGE UP (ref 80–100)
MONOCYTES # BLD AUTO: 0.76 K/UL — SIGNIFICANT CHANGE UP (ref 0–0.9)
MONOCYTES NFR BLD AUTO: 13.3 % — SIGNIFICANT CHANGE UP (ref 2–14)
NEUTROPHILS # BLD AUTO: 3.15 K/UL — SIGNIFICANT CHANGE UP (ref 1.8–7.4)
NEUTROPHILS NFR BLD AUTO: 55.2 % — SIGNIFICANT CHANGE UP (ref 43–77)
NRBC # BLD: 0 /100 WBCS — SIGNIFICANT CHANGE UP (ref 0–0)
PLATELET # BLD AUTO: 253 K/UL — SIGNIFICANT CHANGE UP (ref 150–400)
POTASSIUM SERPL-MCNC: 4.6 MMOL/L — SIGNIFICANT CHANGE UP (ref 3.5–5.3)
POTASSIUM SERPL-SCNC: 4.6 MMOL/L — SIGNIFICANT CHANGE UP (ref 3.5–5.3)
PROT SERPL-MCNC: 7.6 G/DL — SIGNIFICANT CHANGE UP (ref 6–8.3)
RBC # BLD: 4.65 M/UL — SIGNIFICANT CHANGE UP (ref 3.8–5.2)
RBC # FLD: 13.2 % — SIGNIFICANT CHANGE UP (ref 10.3–14.5)
SODIUM SERPL-SCNC: 141 MMOL/L — SIGNIFICANT CHANGE UP (ref 135–145)
WBC # BLD: 5.7 K/UL — SIGNIFICANT CHANGE UP (ref 3.8–10.5)
WBC # FLD AUTO: 5.7 K/UL — SIGNIFICANT CHANGE UP (ref 3.8–10.5)

## 2020-08-15 PROCEDURE — 93010 ELECTROCARDIOGRAM REPORT: CPT

## 2020-08-15 PROCEDURE — 36415 COLL VENOUS BLD VENIPUNCTURE: CPT

## 2020-08-15 PROCEDURE — 99284 EMERGENCY DEPT VISIT MOD MDM: CPT | Mod: 25

## 2020-08-15 PROCEDURE — 83690 ASSAY OF LIPASE: CPT

## 2020-08-15 PROCEDURE — 96374 THER/PROPH/DIAG INJ IV PUSH: CPT

## 2020-08-15 PROCEDURE — 80053 COMPREHEN METABOLIC PANEL: CPT

## 2020-08-15 PROCEDURE — 99284 EMERGENCY DEPT VISIT MOD MDM: CPT

## 2020-08-15 PROCEDURE — 93005 ELECTROCARDIOGRAM TRACING: CPT

## 2020-08-15 PROCEDURE — 85025 COMPLETE CBC W/AUTO DIFF WBC: CPT

## 2020-08-15 RX ORDER — MECLIZINE HCL 12.5 MG
1 TABLET ORAL
Qty: 20 | Refills: 0
Start: 2020-08-15

## 2020-08-15 RX ORDER — ATORVASTATIN CALCIUM 80 MG/1
1 TABLET, FILM COATED ORAL
Qty: 0 | Refills: 0 | DISCHARGE

## 2020-08-15 RX ORDER — FAMOTIDINE 10 MG/ML
20 INJECTION INTRAVENOUS ONCE
Refills: 0 | Status: COMPLETED | OUTPATIENT
Start: 2020-08-15 | End: 2020-08-15

## 2020-08-15 RX ORDER — CETIRIZINE HYDROCHLORIDE 10 MG/1
1 TABLET ORAL
Qty: 0 | Refills: 0 | DISCHARGE

## 2020-08-15 RX ORDER — FAMOTIDINE 10 MG/ML
1 INJECTION INTRAVENOUS
Qty: 30 | Refills: 0
Start: 2020-08-15 | End: 2020-09-13

## 2020-08-15 RX ORDER — SODIUM CHLORIDE 9 MG/ML
1000 INJECTION INTRAMUSCULAR; INTRAVENOUS; SUBCUTANEOUS ONCE
Refills: 0 | Status: COMPLETED | OUTPATIENT
Start: 2020-08-15 | End: 2020-08-15

## 2020-08-15 RX ORDER — ALENDRONATE SODIUM 70 MG/1
1 TABLET ORAL
Qty: 0 | Refills: 0 | DISCHARGE

## 2020-08-15 RX ORDER — MECLIZINE HCL 12.5 MG
25 TABLET ORAL ONCE
Refills: 0 | Status: COMPLETED | OUTPATIENT
Start: 2020-08-15 | End: 2020-08-15

## 2020-08-15 RX ORDER — AMLODIPINE BESYLATE 2.5 MG/1
1 TABLET ORAL
Qty: 0 | Refills: 0 | DISCHARGE

## 2020-08-15 RX ORDER — LOSARTAN POTASSIUM 100 MG/1
1 TABLET, FILM COATED ORAL
Qty: 0 | Refills: 0 | DISCHARGE

## 2020-08-15 RX ADMIN — Medication 25 MILLIGRAM(S): at 16:12

## 2020-08-15 RX ADMIN — Medication 30 MILLILITER(S): at 16:09

## 2020-08-15 RX ADMIN — FAMOTIDINE 20 MILLIGRAM(S): 10 INJECTION INTRAVENOUS at 16:09

## 2020-08-15 RX ADMIN — SODIUM CHLORIDE 1000 MILLILITER(S): 9 INJECTION INTRAMUSCULAR; INTRAVENOUS; SUBCUTANEOUS at 16:11

## 2020-08-15 NOTE — ED ADULT NURSE NOTE - CHPI ED NUR SYMPTOMS NEG
no vomiting/no nausea/no weakness/no loss of consciousness/no change in level of consciousness/no confusion/no fever/no blurred vision/no numbness

## 2020-08-15 NOTE — ED ADULT NURSE NOTE - OBJECTIVE STATEMENT
Patient alert and oriented x 3 came c/o intermitent  dizziness " feels unsteady" for 3 days , stated that fell yesterday while walking to bathroom and hit her rt arm in the wall now is painful . Denies any head injury no loc . Pt. also c/o abdominal pain with bowel movement 2-3x per day for 1 week , also with sob when walking for 2 weeks . Denies any chest pain , headache , Blurry vision , dysuria ,nausea nor vomiting. Seen and examined by Dr. Maddox , not in distress , safety ,maintained , will continue to monitor .

## 2020-08-15 NOTE — ED ADULT TRIAGE NOTE - CHIEF COMPLAINT QUOTE
c/o intermittent dizziness with nausea exacerbated by movement. Denies fever, chills, chest pain, cough, syncope

## 2020-08-15 NOTE — ED PROVIDER NOTE - PATIENT PORTAL LINK FT
You can access the FollowMyHealth Patient Portal offered by [facility] by registering at the following website: [email or web address]. By joining [employer]’s FollowMyHealth portal, you will also be able to view your health information using other applications (apps) compatible with our system.

## 2020-08-15 NOTE — ED ADULT TRIAGE NOTE - PAIN RATING/NUMBER SCALE (0-10): REST
KIMBERLY and John documented completed and scanned to chart. Wife who is surrogate decision maker present for visit and aware of patient's wishes.   
0

## 2020-08-15 NOTE — ED PROVIDER NOTE - OBJECTIVE STATEMENT
history of htn, high cholesterol, here with epigastric abdominal pain for the past week.  Decreased po intake due to pain.  Denies fever/chills, vomiting, diarrhea.  Past 3 days, also with intermittent dizziness.  Worse with certain movements of head.  Last felt this morning, not currently present.  Denies headache, syncope, chest pain.

## 2020-08-15 NOTE — ED ADULT NURSE NOTE - NSIMPLEMENTINTERV_GEN_ALL_ED
Implemented All Fall Risk Interventions:  [place] to call system. Call bell, personal items and telephone within reach. Instruct patient to call for assistance. Room bathroom lighting operational. Non-slip footwear when patient is off stretcher. Physically safe environment: no spills, clutter or unnecessary equipment. Stretcher in lowest position, wheels locked, appropriate side rails in place. Provide visual cue, wrist band, yellow gown, etc. Monitor gait and stability. Monitor for mental status changes and reorient to person, place, and time. Review medications for side effects contributing to fall risk. Reinforce activity limits and safety measures with patient and family.

## 2020-08-15 NOTE — ED PROVIDER NOTE - CLINICAL SUMMARY MEDICAL DECISION MAKING FREE TEXT BOX
intermittent epigastric abd pain / decreased po due to pain.  suspect gastritis/ possible ulcer.  labs done with normal lipase, lft.  mild tender to touch. given gi cocktail with resolution of pain, feels better.  home with antacid, pmd/gi f/u.  also with intermittent vertigo, not currently present.  suspect bppv.  no focal neuro findings to suggest cva, mass.  no signs of infection, ear pathology. plan prn meclizine.  return precautions discussed

## 2020-08-15 NOTE — ED PROVIDER NOTE - NSFOLLOWUPINSTRUCTIONS_ED_ALL_ED_FT
Please see your primary care provider or GI specialist in 2-3 days.  Call for appointment.  If you have any problems with followup, please call the ED Referral Coordinator at [phone].  Return to the ER if symptoms worsen or other concerns.    Gastritis    WHAT YOU NEED TO KNOW:    Gastritis is inflammation or irritation of the lining of your stomach. Abdominal Organs         DISCHARGE INSTRUCTIONS:    Call 911 for any of the following:     You develop chest pain or shortness of breath.        Return to the emergency department if:     You vomit blood.      You have black or bloody bowel movements.      You have severe stomach or back pain.    Contact your healthcare provider if:     You have a fever.      You have new or worsening symptoms, even after treatment.      You have questions or concerns about your condition or care.    Medicines:     Medicines may be given to help treat a bacterial infection or decrease stomach acid.       Take your medicine as directed. Contact your healthcare provider if you think your medicine is not helping or if you have side effects. Tell him or her if you are allergic to any medicine. Keep a list of the medicines, vitamins, and herbs you take. Include the amounts, and when and why you take them. Bring the list or the pill bottles to follow-up visits. Carry your medicine list with you in case of an emergency.    Manage or prevent gastritis:     Do not smoke. Nicotine and other chemicals in cigarettes and cigars can make your symptoms worse and cause lung damage. Ask your healthcare provider for information if you currently smoke and need help to quit. E-cigarettes or smokeless tobacco still contain nicotine. Talk to your healthcare provider before you use these products.       Do not drink alcohol. Alcohol can prevent healing and make your gastritis worse. Talk to your healthcare provider if you need help to stop drinking.      Do not take NSAIDs or aspirin unless directed. These and similar medicines can cause irritation. If your healthcare provider says it is okay to take NSAIDs, take them with food.       Do not eat foods that cause irritation. Foods such as oranges and salsa can cause burning or pain. Eat a variety of healthy foods. Examples include fruits (not citrus), vegetables, low-fat dairy products, beans, whole-grain breads, and lean meats and fish. Try to eat small meals, and drink water with your meals. Do not eat for at least 3 hours before you go to bed.      Find ways to relax and decrease stress. Stress can increase stomach acid and make gastritis worse. Activities such as yoga, meditation, or listening to music can help you relax. Spend time with friends, or do things you enjoy.    Follow up with your healthcare provider as directed: You may need ongoing tests or treatment, or referral to a gastroenterologist. Write down your questions so you remember to ask them during your visits.    Vertigo  Vertigo means that you feel like you are moving when you are not. Vertigo can also make you feel like things around you are moving when they are not. This feeling can come and go at any time. Vertigo often goes away on its own.    Follow these instructions at home:  Avoid making fast movements.  Avoid driving.  Avoid using heavy machinery.  Avoid doing any task or activity that might cause danger to you or other people if you would have a vertigo attack while you are doing it.  Sit down right away if you feel dizzy or have trouble with your balance.  Take over-the-counter and prescription medicines only as told by your doctor.  Follow instructions from your doctor about which positions or movements you should avoid.  Drink enough fluid to keep your pee (urine) clear or pale yellow.  Keep all follow-up visits as told by your doctor. This is important.  Contact a doctor if:  Medicine does not help your vertigo.  You have a fever.  Your problems get worse or you have new symptoms.  Your family or friends see changes in your behavior.  You feel sick to your stomach (nauseous) or you throw up (vomit).  You have a “pins and needles” feeling or you are numb in part of your body.  Get help right away if:  You have trouble moving or talking.  You are always dizzy.  You pass out (faint).  You get very bad headaches.  You feel weak or have trouble using your hands, arms, or legs.  You have changes in your hearing.  You have changes in your seeing (vision).  You get a stiff neck.  Bright light starts to bother you.  This information is not intended to replace advice given to you by your health care provider. Make sure you discuss any questions you have with your health care provider.    Acute Abdominal Pain    WHAT YOU NEED TO KNOW:    The cause of your abdominal pain may not be found. If a cause is found, treatment will depend on what the cause is.     DISCHARGE INSTRUCTIONS:    Return to the emergency department if:     You vomit blood or cannot stop vomiting.      You have blood in your bowel movement or it looks like tar.       You have bleeding from your rectum.       Your abdomen is larger than usual, more painful, and hard.       You have severe pain in your abdomen.       You stop passing gas and having bowel movements.       You feel weak, dizzy, or faint.    Contact your healthcare provider if:     You have a fever.      You have new signs and symptoms.      Your symptoms do not get better with treatment.       You have questions or concerns about your condition or care.    Medicines may be given to decrease pain, treat an infection, and manage your symptoms. Take your medicine as directed. Call your healthcare provider if you think your medicine is not helping or if you have side effects. Tell him if you are allergic to any medicine. Keep a list of the medicines, vitamins, and herbs you take. Include the amounts, and when and why you take them. Bring the list or the pill bottles to follow-up visits. Carry your medicine list with you in case of an emergency.    Manage your symptoms:     Apply heat on your abdomen for 20 to 30 minutes every 2 hours for as many days as directed. Heat helps decrease pain and muscle spasms.       Manage your stress. Stress may cause abdominal pain. Your healthcare provider may recommend relaxation techniques and deep breathing exercises to help decrease your stress. Your healthcare provider may recommend you talk to someone about your stress or anxiety, such as a counselor or a trusted friend. Get plenty of sleep and exercise regularly.       Limit or do not drink alcohol. Alcohol can make your abdominal pain worse. Ask your healthcare provider if it is safe for you to drink alcohol. Also ask how much is safe for you to drink.       Do not smoke. Nicotine and other chemicals in cigarettes can damage your esophagus and stomach. Ask your healthcare provider for information if you currently smoke and need help to quit. E-cigarettes or smokeless tobacco still contain nicotine. Talk to your healthcare provider before you use these products.     Make changes to the food you eat as directed: Do not eat foods that cause abdominal pain or other symptoms. Eat small meals more often.     Eat more high-fiber foods if you are constipated. High-fiber foods include fruits, vegetables, whole-grain foods, and legumes.       Do not eat foods that cause gas if you have bloating. Examples include broccoli, cabbage, and cauliflower. Do not drink soda or carbonated drinks, because these may also cause gas.       Do not eat foods or drinks that contain sorbitol or fructose if you have diarrhea and bloating. Some examples are fruit juices, candy, jelly, and sugar-free gum.       Do not eat high-fat foods, such as fried foods, cheeseburgers, hot dogs, and desserts.      Limit or do not drink caffeine. Caffeine may make symptoms, such as heart burn or nausea, worse.       Drink plenty of liquids to prevent dehydration from diarrhea or vomiting. Ask your healthcare provider how much liquid to drink each day and which liquids are best for you.     Follow up with your healthcare provider as directed: Write down your questions so you remember to ask them during your visits.        SEEK IMMEDIATE MEDICAL CARE IF YOU HAVE ANY OF THE FOLLOWING SYMPTOMS: worsening abdominal pain, uncontrollable vomiting, profuse diarrhea, inability to have bowel movements or pass gas, black or bloody stools, fever accompanying chest pain or back pain, or fainting. These symptoms may represent a serious problem that is an emergency. Do not wait to see if the symptoms will go away. Get medical help right away. Call 911 and do not drive yourself to the hospital.

## 2020-09-18 PROBLEM — E78.00 PURE HYPERCHOLESTEROLEMIA, UNSPECIFIED: Chronic | Status: ACTIVE | Noted: 2020-08-15

## 2020-11-11 ENCOUNTER — APPOINTMENT (OUTPATIENT)
Dept: NEUROLOGY | Facility: CLINIC | Age: 85
End: 2020-11-11

## 2021-04-14 NOTE — ED ADULT NURSE NOTE - HOW OFTEN DO YOU HAVE A DRINK CONTAINING ALCOHOL?
Follow-up with PCP as outpatient in 1 week  Follow-up with Dr Vicente Winter as outpatient  Follow-up with vascular surgery as outpatient  Return to ER with any worsening shortness of breath, abdominal pain, palpitations, confusion or any other alarming symptoms 
Never

## 2021-05-13 ENCOUNTER — EMERGENCY (EMERGENCY)
Facility: HOSPITAL | Age: 86
LOS: 1 days | Discharge: HOME | End: 2021-05-13
Attending: EMERGENCY MEDICINE | Admitting: EMERGENCY MEDICINE
Payer: MEDICARE

## 2021-05-13 VITALS
DIASTOLIC BLOOD PRESSURE: 78 MMHG | RESPIRATION RATE: 18 BRPM | SYSTOLIC BLOOD PRESSURE: 145 MMHG | HEART RATE: 82 BPM | HEIGHT: 59 IN | WEIGHT: 85.98 LBS | OXYGEN SATURATION: 97 % | TEMPERATURE: 98 F

## 2021-05-13 DIAGNOSIS — R21 RASH AND OTHER NONSPECIFIC SKIN ERUPTION: ICD-10-CM

## 2021-05-13 DIAGNOSIS — Z98.890 OTHER SPECIFIED POSTPROCEDURAL STATES: Chronic | ICD-10-CM

## 2021-05-13 DIAGNOSIS — Z88.5 ALLERGY STATUS TO NARCOTIC AGENT: ICD-10-CM

## 2021-05-13 DIAGNOSIS — Z88.0 ALLERGY STATUS TO PENICILLIN: ICD-10-CM

## 2021-05-13 DIAGNOSIS — E78.5 HYPERLIPIDEMIA, UNSPECIFIED: ICD-10-CM

## 2021-05-13 DIAGNOSIS — I10 ESSENTIAL (PRIMARY) HYPERTENSION: ICD-10-CM

## 2021-05-13 DIAGNOSIS — Z00.00 ENCOUNTER FOR GENERAL ADULT MEDICAL EXAMINATION WITHOUT ABNORMAL FINDINGS: ICD-10-CM

## 2021-05-13 DIAGNOSIS — Z98.890 OTHER SPECIFIED POSTPROCEDURAL STATES: ICD-10-CM

## 2021-05-13 DIAGNOSIS — M79.89 OTHER SPECIFIED SOFT TISSUE DISORDERS: ICD-10-CM

## 2021-05-13 PROCEDURE — 99282 EMERGENCY DEPT VISIT SF MDM: CPT

## 2021-05-13 PROCEDURE — 99283 EMERGENCY DEPT VISIT LOW MDM: CPT

## 2021-05-13 NOTE — ED ADULT NURSE NOTE - OBJECTIVE STATEMENT
PT is an 84 y/o female A&Ox4 in NAD ambulatory with steady gait c/o b/l feet "itchiness" x one month, resolved on arrival. PT denies pain, swelling despite triage note. Upon assessment no ecchymosis or edema noted. Pt talking in clear, full sentences, respirations even and unlabored.

## 2021-05-13 NOTE — ED ADULT NURSE NOTE - NSIMPLEMENTINTERV_GEN_ALL_ED
Implemented All Fall with Harm Risk Interventions:  [place] to call system. Call bell, personal items and telephone within reach. Instruct patient to call for assistance. Room bathroom lighting operational. Non-slip footwear when patient is off stretcher. Physically safe environment: no spills, clutter or unnecessary equipment. Stretcher in lowest position, wheels locked, appropriate side rails in place. Provide visual cue, wrist band, yellow gown, etc. Monitor gait and stability. Monitor for mental status changes and reorient to person, place, and time. Review medications for side effects contributing to fall risk. Reinforce activity limits and safety measures with patient and family. Provide visual clues: red socks.

## 2021-05-13 NOTE — ED PROVIDER NOTE - CLINICAL SUMMARY MEDICAL DECISION MAKING FREE TEXT BOX
resolved rash, unclear what caused it, consider resolved dermatitis resolved fungal?   advised to reassess w doctor if reoccurs and to bring cream used at time of visit,   advised keeping feet clean / dry and moisturized.

## 2021-05-13 NOTE — ED PROVIDER NOTE - OBJECTIVE STATEMENT
84 yo reports she had a itchy rash on her ankles for 3 months , put an over the counter cream, unsure type on it and it resolved, now rash and itchyness entirely resolved, reports she is worried it might come back and wants to know what it was, feels well now,

## 2021-05-13 NOTE — ED PROVIDER NOTE - PATIENT PORTAL LINK FT
You can access the FollowMyHealth Patient Portal offered by [facility] by registering at the following website: [email or web address]. By joining [employer]’s FollowMyHealth portal, you will also be able to view your health information using other applications (apps) compatible with our system.

## 2021-07-17 ENCOUNTER — EMERGENCY (EMERGENCY)
Facility: HOSPITAL | Age: 86
LOS: 1 days | Discharge: HOME | End: 2021-07-17
Attending: EMERGENCY MEDICINE | Admitting: EMERGENCY MEDICINE
Payer: MEDICARE

## 2021-07-17 VITALS
HEART RATE: 81 BPM | OXYGEN SATURATION: 95 % | DIASTOLIC BLOOD PRESSURE: 62 MMHG | RESPIRATION RATE: 16 BRPM | SYSTOLIC BLOOD PRESSURE: 138 MMHG

## 2021-07-17 VITALS
WEIGHT: 73.41 LBS | OXYGEN SATURATION: 95 % | DIASTOLIC BLOOD PRESSURE: 70 MMHG | HEART RATE: 60 BPM | SYSTOLIC BLOOD PRESSURE: 136 MMHG | RESPIRATION RATE: 16 BRPM | TEMPERATURE: 98 F | HEIGHT: 59 IN

## 2021-07-17 DIAGNOSIS — E78.5 HYPERLIPIDEMIA, UNSPECIFIED: ICD-10-CM

## 2021-07-17 DIAGNOSIS — R21 RASH AND OTHER NONSPECIFIC SKIN ERUPTION: ICD-10-CM

## 2021-07-17 DIAGNOSIS — K06.8 OTHER SPECIFIED DISORDERS OF GINGIVA AND EDENTULOUS ALVEOLAR RIDGE: ICD-10-CM

## 2021-07-17 DIAGNOSIS — Z98.890 OTHER SPECIFIED POSTPROCEDURAL STATES: Chronic | ICD-10-CM

## 2021-07-17 DIAGNOSIS — E78.00 PURE HYPERCHOLESTEROLEMIA, UNSPECIFIED: ICD-10-CM

## 2021-07-17 DIAGNOSIS — I10 ESSENTIAL (PRIMARY) HYPERTENSION: ICD-10-CM

## 2021-07-17 DIAGNOSIS — Z88.0 ALLERGY STATUS TO PENICILLIN: ICD-10-CM

## 2021-07-17 DIAGNOSIS — R63.0 ANOREXIA: ICD-10-CM

## 2021-07-17 DIAGNOSIS — Z88.5 ALLERGY STATUS TO NARCOTIC AGENT: ICD-10-CM

## 2021-07-17 LAB
ALBUMIN SERPL ELPH-MCNC: 4.2 G/DL — SIGNIFICANT CHANGE UP (ref 3.3–5)
ALP SERPL-CCNC: 83 U/L — SIGNIFICANT CHANGE UP (ref 40–120)
ALT FLD-CCNC: 15 U/L — SIGNIFICANT CHANGE UP (ref 10–45)
ANION GAP SERPL CALC-SCNC: 9 MMOL/L — SIGNIFICANT CHANGE UP (ref 5–17)
APPEARANCE UR: CLEAR — SIGNIFICANT CHANGE UP
APTT BLD: 29.2 SEC — SIGNIFICANT CHANGE UP (ref 27.5–35.5)
AST SERPL-CCNC: 23 U/L — SIGNIFICANT CHANGE UP (ref 10–40)
BACTERIA # UR AUTO: PRESENT /HPF
BASOPHILS # BLD AUTO: 0.03 K/UL — SIGNIFICANT CHANGE UP (ref 0–0.2)
BASOPHILS NFR BLD AUTO: 0.5 % — SIGNIFICANT CHANGE UP (ref 0–2)
BILIRUB SERPL-MCNC: 0.4 MG/DL — SIGNIFICANT CHANGE UP (ref 0.2–1.2)
BILIRUB UR-MCNC: NEGATIVE — SIGNIFICANT CHANGE UP
BUN SERPL-MCNC: 25 MG/DL — HIGH (ref 7–23)
CALCIUM SERPL-MCNC: 9.4 MG/DL — SIGNIFICANT CHANGE UP (ref 8.4–10.5)
CHLORIDE SERPL-SCNC: 108 MMOL/L — SIGNIFICANT CHANGE UP (ref 96–108)
CO2 SERPL-SCNC: 25 MMOL/L — SIGNIFICANT CHANGE UP (ref 22–31)
COLOR SPEC: YELLOW — SIGNIFICANT CHANGE UP
CREAT SERPL-MCNC: 0.72 MG/DL — SIGNIFICANT CHANGE UP (ref 0.5–1.3)
DIFF PNL FLD: NEGATIVE — SIGNIFICANT CHANGE UP
EOSINOPHIL # BLD AUTO: 0.08 K/UL — SIGNIFICANT CHANGE UP (ref 0–0.5)
EOSINOPHIL NFR BLD AUTO: 1.4 % — SIGNIFICANT CHANGE UP (ref 0–6)
EPI CELLS # UR: SIGNIFICANT CHANGE UP /HPF (ref 0–5)
GLUCOSE SERPL-MCNC: 109 MG/DL — HIGH (ref 70–99)
GLUCOSE UR QL: NEGATIVE — SIGNIFICANT CHANGE UP
HCT VFR BLD CALC: 40.2 % — SIGNIFICANT CHANGE UP (ref 34.5–45)
HGB BLD-MCNC: 12.8 G/DL — SIGNIFICANT CHANGE UP (ref 11.5–15.5)
IMM GRANULOCYTES NFR BLD AUTO: 0.2 % — SIGNIFICANT CHANGE UP (ref 0–1.5)
INR BLD: 1.05 — SIGNIFICANT CHANGE UP (ref 0.88–1.16)
KETONES UR-MCNC: NEGATIVE — SIGNIFICANT CHANGE UP
LEUKOCYTE ESTERASE UR-ACNC: ABNORMAL
LYMPHOCYTES # BLD AUTO: 1.29 K/UL — SIGNIFICANT CHANGE UP (ref 1–3.3)
LYMPHOCYTES # BLD AUTO: 22.1 % — SIGNIFICANT CHANGE UP (ref 13–44)
MCHC RBC-ENTMCNC: 28.3 PG — SIGNIFICANT CHANGE UP (ref 27–34)
MCHC RBC-ENTMCNC: 31.8 GM/DL — LOW (ref 32–36)
MCV RBC AUTO: 88.9 FL — SIGNIFICANT CHANGE UP (ref 80–100)
MONOCYTES # BLD AUTO: 0.48 K/UL — SIGNIFICANT CHANGE UP (ref 0–0.9)
MONOCYTES NFR BLD AUTO: 8.2 % — SIGNIFICANT CHANGE UP (ref 2–14)
NEUTROPHILS # BLD AUTO: 3.96 K/UL — SIGNIFICANT CHANGE UP (ref 1.8–7.4)
NEUTROPHILS NFR BLD AUTO: 67.6 % — SIGNIFICANT CHANGE UP (ref 43–77)
NITRITE UR-MCNC: NEGATIVE — SIGNIFICANT CHANGE UP
NRBC # BLD: 0 /100 WBCS — SIGNIFICANT CHANGE UP (ref 0–0)
PH UR: 6 — SIGNIFICANT CHANGE UP (ref 5–8)
PLATELET # BLD AUTO: 280 K/UL — SIGNIFICANT CHANGE UP (ref 150–400)
POTASSIUM SERPL-MCNC: 3.9 MMOL/L — SIGNIFICANT CHANGE UP (ref 3.5–5.3)
POTASSIUM SERPL-SCNC: 3.9 MMOL/L — SIGNIFICANT CHANGE UP (ref 3.5–5.3)
PROT SERPL-MCNC: 7.2 G/DL — SIGNIFICANT CHANGE UP (ref 6–8.3)
PROT UR-MCNC: NEGATIVE MG/DL — SIGNIFICANT CHANGE UP
PROTHROM AB SERPL-ACNC: 12.6 SEC — SIGNIFICANT CHANGE UP (ref 10.6–13.6)
RBC # BLD: 4.52 M/UL — SIGNIFICANT CHANGE UP (ref 3.8–5.2)
RBC # FLD: 14.1 % — SIGNIFICANT CHANGE UP (ref 10.3–14.5)
SODIUM SERPL-SCNC: 142 MMOL/L — SIGNIFICANT CHANGE UP (ref 135–145)
SP GR SPEC: >=1.03 — SIGNIFICANT CHANGE UP (ref 1–1.03)
UROBILINOGEN FLD QL: 0.2 E.U./DL — SIGNIFICANT CHANGE UP
WBC # BLD: 5.85 K/UL — SIGNIFICANT CHANGE UP (ref 3.8–10.5)
WBC # FLD AUTO: 5.85 K/UL — SIGNIFICANT CHANGE UP (ref 3.8–10.5)
WBC UR QL: < 5 /HPF — SIGNIFICANT CHANGE UP

## 2021-07-17 PROCEDURE — 99283 EMERGENCY DEPT VISIT LOW MDM: CPT | Mod: 25

## 2021-07-17 PROCEDURE — 99284 EMERGENCY DEPT VISIT MOD MDM: CPT

## 2021-07-17 PROCEDURE — 85730 THROMBOPLASTIN TIME PARTIAL: CPT

## 2021-07-17 PROCEDURE — 71046 X-RAY EXAM CHEST 2 VIEWS: CPT

## 2021-07-17 PROCEDURE — 36415 COLL VENOUS BLD VENIPUNCTURE: CPT

## 2021-07-17 PROCEDURE — 71046 X-RAY EXAM CHEST 2 VIEWS: CPT | Mod: 26

## 2021-07-17 PROCEDURE — 80053 COMPREHEN METABOLIC PANEL: CPT

## 2021-07-17 PROCEDURE — 85025 COMPLETE CBC W/AUTO DIFF WBC: CPT

## 2021-07-17 PROCEDURE — 81001 URINALYSIS AUTO W/SCOPE: CPT

## 2021-07-17 PROCEDURE — 85610 PROTHROMBIN TIME: CPT

## 2021-07-17 PROCEDURE — 82180 ASSAY OF ASCORBIC ACID: CPT

## 2021-07-17 RX ORDER — SODIUM CHLORIDE 9 MG/ML
1000 INJECTION INTRAMUSCULAR; INTRAVENOUS; SUBCUTANEOUS ONCE
Refills: 0 | Status: COMPLETED | OUTPATIENT
Start: 2021-07-17 | End: 2021-07-17

## 2021-07-17 RX ADMIN — SODIUM CHLORIDE 1000 MILLILITER(S): 9 INJECTION INTRAMUSCULAR; INTRAVENOUS; SUBCUTANEOUS at 16:58

## 2021-07-17 NOTE — ED PROVIDER NOTE - PHYSICAL EXAMINATION
CONSTITUTIONAL: Well-appearing; elderly, thin, not cachectic.  HEAD: Normocephalic; atraumatic.   EYES:  conjunctiva and sclera clear  ENT: normal nose; no rhinorrhea; normal pharynx with no erythema or lesions.   NECK: Supple; non-tender;   CARDIOVASCULAR: Normal S1, S2; no murmurs, rubs, or gallops. Regular rate and rhythm.   RESPIRATORY: Breathing easily; breath sounds clear and equal bilaterally; no wheezes, rhonchi, or rales.  GI: Soft; non-distended; non-tender; no palpable organomegaly.   EXT: No cyanosis or edema; N/V intact  SKIN: Scatter papules at neck, appears to be insect bites; no vesicles ,no erythema  NEURO: A & O x 3; face symmetric; grossly unremarkable.   PSYCHOLOGICAL: The patient’s mood and manner are appropriate.

## 2021-07-17 NOTE — ED ADULT NURSE NOTE - OBJECTIVE STATEMENT
Presents for c/o weight loss and decreased appetite x 3 months, denies pain or n/v/d/c, denies weakness or dizziness.

## 2021-07-17 NOTE — ED ADULT TRIAGE NOTE - CHIEF COMPLAINT QUOTE
Pt reports loss of appetite, weight loss from 81lbs to 61lbs "in a couple months" also reports bleeding of gums, "there's blood on my pillow when I wake up." Pt also reports rash, dryness to right side of neck. Pt on ASA 81.

## 2021-07-17 NOTE — ED PROVIDER NOTE - PROGRESS NOTE DETAILS
labs wnl, except for elevated BUN, likely pre-renal due to dec PO intake. CXr clear. UA bacteria without wbcs, pt asymptomatic. No criteria to admit. Pt well taken care of and son at bedside. Will dc home with pmd follow up

## 2021-07-17 NOTE — ED PROVIDER NOTE - PATIENT PORTAL LINK FT
You can access the FollowMyHealth Patient Portal offered by [facility] by registering at the following website: [email or web address]. By joining [employer]’s FollowMyHealth portal, you will also be able to view your health information using other applications (apps) compatible with our system.

## 2021-07-17 NOTE — ED PROVIDER NOTE - CLINICAL SUMMARY MEDICAL DECISION MAKING FREE TEXT BOX
85 y/o F patient with a pmhx of HTN, HLD,  presents to ED complaining of x3 months of decreased appetite, otherwise well. Vitals are stable, well appearing, no active bleding,     exam un  blood cxr, if work up  likely d.c pmd f/u 87 y/o F patient with a pmhx of HTN, HLD,  presents to ED complaining of x3 months of decreased appetite, otherwise well. Vitals are stable, well appearing, no active bleeding. Exam unremarkable, pt thin but very well appearing. Labs, CXR, UA.

## 2021-07-17 NOTE — ED PROVIDER NOTE - NSFOLLOWUPINSTRUCTIONS_ED_ALL_ED_FT
Please follow up with your primary care doctor in 24-48 hours. Return to ED for any worsening or emergent symptoms.    Anorexia in Older Adults    WHAT YOU NEED TO KNOW:    What is anorexia? Anorexia is a loss of appetite, decreased food intake, or both. Your appetite naturally decreases as you get older. You also get full faster than you used to. This occurs because your body needs less energy. Other natural body changes can also lead to a decreased appetite. Even though some appetite loss is normal, you still need to get enough calories and nutrients to keep you healthy. You can start to lose too much weight if you do not eat as much food as your body needs. Unwanted weight loss can cause health problems, or worsen health problems you already have. You can also become dehydrated if you do not drink enough liquid.    What causes anorexia in older adults?   •Decreased sense of taste and smell      •Living alone      •Change in environment, such as moving to a nursing home      •Low income      •Dental problems, dentures that do not fit well, or chewing or swallowing problems      •Medical conditions that affect your appetite, such as cancer, depression, dementia, or alcoholism      •Medical conditions that affect your ability to prepare food or eat, such as poor eyesight or Parkinson disease      •Medicines that decrease your appetite or sense of taste and smell      •Alcoholism or other substance abuse      How can I eat healthy and get enough nutrients?   •Choose healthy foods. Eat a variety of fruits, vegetables, whole grains, low-fat dairy foods, lean meats, and other protein foods. Limit foods high in fat, sugar, and salt. Limit or avoid alcohol as directed. Work with a dietitian to help you plan your meals if you need to follow a special diet. A dietitian can also teach you how to modify foods if you have trouble chewing or swallowing.  Healthy Foods           •Snack on healthy foods between meals if you only eat a small amount during meals. Snacks provide extra healthy nutrients and calories between meals. Examples include fruit, cheese, and whole grain crackers.      •Drink liquids as directed to avoid dehydration. Drink liquids between meals if they cause you to get full too quickly during meals. Ask how much liquid to drink each day and which liquids are best for you.      •Use herbs, spices, and flavor enhancers to add flavor to foods. Avoid using herbs and spice blends that also contain sodium. Ask your healthcare provider or dietitian about flavor enhancers. Flavor enhancers with ham, natural bui, and roast beef flavors can also be sprinkled on food to add flavor.      •Share meals with others as often as you can. Eating with others may help you to eat better during meal time. Ask family members, neighbors, or friends to join you for lunch. There are also senior centers where you can meet people, and share meals with them.      •Ask family and friends for help with shopping or preparing foods. Ask for a ride to the grocery store, if needed.      How can I work with my healthcare provider to stay healthy?   •Tell your healthcare provider about any illnesses or medicines that have decreased your appetite. He or she may be able to change your medicines. Your healthcare provider may also be able to prescribe medicines that can increase your appetite.      •Ask your healthcare provider about nutrition supplements you can have between meals. Nutrition supplements can provide extra calories and nutrients if you are not getting enough through food. Nutrition supplements are available in liquids, puddings, bars, and soups.      •Talk to your healthcare provider about safe physical activities you can do. Physical activity may help to increase your appetite. It can also help to strengthen your muscles and bones.  [employer] [facility] [employer]           •Ask your healthcare provider about programs that can help you buy food or provide meals. There are programs that provide financial assistance for food if you have a low income. There are also programs in some areas that may be able to deliver healthy prepared meals to your home.      When should I call my doctor?   •You are losing weight.      •You feel depressed, confused, tired, irritable, and you do not feel like eating.      •You have signs of dehydration. Examples include dark yellow urine, dry mouth and lips, dry skin, fast heartbeat, and urinating less than usual.      •You have questions or concerns about your condition or care.      CARE AGREEMENT:    You have the right to help plan your care. Learn about your health condition and how it may be treated. Discuss treatment options with your healthcare providers to decide what care you want to receive. You always have the right to refuse treatment.

## 2021-07-17 NOTE — ED PROVIDER NOTE - ATTENDING CONTRIBUTION TO CARE
87yo F hx HTN, HLD here w/ weight loss and poor appetite for past several months, and gum bleeding after brushing teeth. VSS, well appearing thin older woman, impressively dressed. still able to maintain herself, no concern for social situation. plan to check labs and have pt follow up with her PCP

## 2021-07-17 NOTE — ED PROVIDER NOTE - OBJECTIVE STATEMENT
86 y/ with a pmhx of HLD, and HTN complaining of x3 months of decreased appetite and weight loss. Patient was seen by her PCP and was told to drink 3 servings of Ensure beverages, instead of 2, but patient reports that she does not want to increase her Ensure intake as it gives her diarrhea. Patient admits to bleeding gums while brushing and on her pillow upon waking up.  Patient is complaining of rash and itching to feet bilaterally, neck, and legs bilaterally [denies new medications, new detergents, new lotions]. Pt otherwise well, denying any fever, chills, cough, chest pain, SOB, nausea, vomiting, diarrhea, blood in stool, urinary symptoms. Patient denies blood thinner use.

## 2021-07-17 NOTE — ED PROVIDER NOTE - CHPI ED SYMPTOMS NEG
no diarrhea, no urinary symptoms, no blood in stool, no SOB, no chest pain, no cough/no fever/no nausea/no vomiting/no chills

## 2021-07-23 LAB — VIT C SERPL-MCNC: 0.6 MG/DL — SIGNIFICANT CHANGE UP (ref 0.4–2)

## 2021-08-01 ENCOUNTER — EMERGENCY (EMERGENCY)
Facility: HOSPITAL | Age: 86
LOS: 1 days | Discharge: HOME | End: 2021-08-01
Admitting: EMERGENCY MEDICINE
Payer: MEDICARE

## 2021-08-01 VITALS
OXYGEN SATURATION: 98 % | DIASTOLIC BLOOD PRESSURE: 70 MMHG | HEART RATE: 70 BPM | WEIGHT: 72.09 LBS | SYSTOLIC BLOOD PRESSURE: 132 MMHG | HEIGHT: 59 IN | TEMPERATURE: 98 F | RESPIRATION RATE: 18 BRPM

## 2021-08-01 DIAGNOSIS — R21 RASH AND OTHER NONSPECIFIC SKIN ERUPTION: ICD-10-CM

## 2021-08-01 DIAGNOSIS — Z98.890 OTHER SPECIFIED POSTPROCEDURAL STATES: Chronic | ICD-10-CM

## 2021-08-01 DIAGNOSIS — Z88.0 ALLERGY STATUS TO PENICILLIN: ICD-10-CM

## 2021-08-01 DIAGNOSIS — I10 ESSENTIAL (PRIMARY) HYPERTENSION: ICD-10-CM

## 2021-08-01 DIAGNOSIS — Z88.1 ALLERGY STATUS TO OTHER ANTIBIOTIC AGENTS STATUS: ICD-10-CM

## 2021-08-01 DIAGNOSIS — E78.5 HYPERLIPIDEMIA, UNSPECIFIED: ICD-10-CM

## 2021-08-01 PROCEDURE — 99283 EMERGENCY DEPT VISIT LOW MDM: CPT

## 2021-08-01 RX ORDER — HYDROCORTISONE 1 %
1 OINTMENT (GRAM) TOPICAL
Qty: 1 | Refills: 0
Start: 2021-08-01 | End: 2021-08-07

## 2021-08-01 NOTE — ED PROVIDER NOTE - SKIN, MLM
+ thickened flaky skin to neck with excoriations present to right neck. no d/c. no erythema. no vesicles. trunk and extremities no rash.

## 2021-08-01 NOTE — ED PROVIDER NOTE - NSFOLLOWUPINSTRUCTIONS_ED_ALL_ED_FT
Follow up with dermatology and your primary care physician within one week.         Atopic Dermatitis      Atopic dermatitis is a skin disorder that causes inflammation of the skin. This is the most common type of eczema. Eczema is a group of skin conditions that cause the skin to be itchy, red, and swollen. This condition is generally worse during the cooler winter months and often improves during the warm summer months. Symptoms can vary from person to person.    Atopic dermatitis usually starts showing signs in infancy and can last through adulthood. This condition cannot be passed from one person to another (non-contagious), but it is more common in families. Atopic dermatitis may not always be present. When it is present, it is called a flare-up.      What are the causes?  The exact cause of this condition is not known. Flare-ups of the condition may be triggered by:  •Contact with something that you are sensitive or allergic to.      •Stress.      •Certain foods.      •Extremely hot or cold weather.      •Harsh chemicals and soaps.      •Dry air.      •Chlorine.        What increases the risk?    This condition is more likely to develop in people who have a personal history or family history of eczema, allergies, asthma, or hay fever.      What are the signs or symptoms?  Symptoms of this condition include:  •Dry, scaly skin.      •Red, itchy rash.      •Itchiness, which can be severe. This may occur before the skin rash. This can make sleeping difficult.      •Skin thickening and cracking that can occur over time.        How is this diagnosed?    This condition is diagnosed based on your symptoms, a medical history, and a physical exam.      How is this treated?  There is no cure for this condition, but symptoms can usually be controlled. Treatment focuses on:  •Controlling the itchiness and scratching. You may be given medicines, such as antihistamines or steroid creams.      •Limiting exposure to things that you are sensitive or allergic to (allergens).      •Recognizing situations that cause stress and developing a plan to manage stress.      If your atopic dermatitis does not get better with medicines, or if it is all over your body (widespread), a treatment using a specific type of light (phototherapy) may be used.      Follow these instructions at home:      Skin care    •Keep your skin well-moisturized. Doing this seals in moisture and helps to prevent dryness.  •Use unscented lotions that have petroleum in them.      •Avoid lotions that contain alcohol or water. They can dry the skin.      •Keep baths or showers short (less than 5 minutes) in warm water. Do not use hot water.  •Use mild, unscented cleansers for bathing. Avoid soap and bubble bath.      •Apply a moisturizer to your skin right after a bath or shower.        • Do not apply anything to your skin without checking with your health care provider.      General instructions     •Dress in clothes made of cotton or cotton blends. Dress lightly because heat increases itchiness.      •When washing your clothes, rinse your clothes twice so all of the soap is removed.      •Avoid any triggers that can cause a flare-up.      •Try to manage your stress.      •Keep your fingernails cut short.      •Avoid scratching. Scratching makes the rash and itchiness worse. It may also result in a skin infection (impetigo) due to a break in the skin caused by scratching.      •Take or apply over-the-counter and prescription medicines only as told by your health care provider.      •Keep all follow-up visits as told by your health care provider. This is important.      • Do not be around people who have cold sores or fever blisters. If you get the infection, it may cause your atopic dermatitis to worsen.        Contact a health care provider if:    •Your itchiness interferes with sleep.      •Your rash gets worse or it is not better within one week of starting treatment.      •You have a fever.      •You have a rash flare-up after having contact with someone who has cold sores or fever blisters.        Get help right away if:    •You develop pus or soft yellow scabs in the rash area.        Summary    •This condition causes a red rash and itchy, dry, scaly skin.      •Treatment focuses on controlling the itchiness and scratching, limiting exposure to things that you are sensitive or allergic to (allergens), recognizing situations that cause stress, and developing a plan to manage stress.      •Keep your skin well-moisturized.      •Keep baths or showers shorter than 5 minutes and use warm water. Do not use hot water.      This information is not intended to replace advice given to you by your health care provider. Make sure you discuss any questions you have with your health care provider.      Document Revised: 04/07/2020 Document Reviewed: 01/19/2018    [employer] Patient Education © 2021 [employer]

## 2021-08-01 NOTE — ED ADULT NURSE NOTE - OBJECTIVE STATEMENT
86y F, A&ox3, presents to ed for generalized rash to neck for months. No pain, no cp, no sob. airway patent speaking full complete sentences.

## 2021-08-01 NOTE — ED PROVIDER NOTE - OBJECTIVE STATEMENT
86 y/.o female with hx of HLD, HTN c/o rash to neck x 1 month. pt states itchy rash to neck for past 1 month. no relief with allegra. pt denies new products or lotions. no throat swelling or pain. no fever or chills. no rashes or itching to remainder of body. no n/v. pt has not seen an MD for this problem. Pt reports currently suppose to be taking clinda for gum bleeding but has not been taking the medicatoin. no further complaints.

## 2021-08-01 NOTE — ED PROVIDER NOTE - CARE PROVIDER_API CALL
PARIS PARRY  Dermatology  [address], [facility] [address]  [place] [address]  Phone: [phone]  Fax: [phone]  Follow Up Time: 4-6 Days

## 2021-08-01 NOTE — ED PROVIDER NOTE - PATIENT PORTAL LINK FT
You can access the FollowMyHealth Patient Portal offered by [facility] by registering at the following website: [email or web address]. By joining [employer]’s FollowMyHealth portal, you will also be able to view your health information using other applications (apps) compatible with our system.

## 2021-08-01 NOTE — ED PROVIDER NOTE - CLINICAL SUMMARY MEDICAL DECISION MAKING FREE TEXT BOX
rash to neck. no evidence of cellulitis on exam. no vesicles to suggest hsv. suspect atopic dermatitis. will tx with hydrocortisone cream, benadryl prn itching and f/u with derm. return precautions d/w pt.

## 2021-10-10 ENCOUNTER — EMERGENCY (EMERGENCY)
Facility: HOSPITAL | Age: 86
LOS: 1 days | Discharge: HOME | End: 2021-10-10
Attending: EMERGENCY MEDICINE | Admitting: EMERGENCY MEDICINE
Payer: MEDICARE

## 2021-10-10 VITALS
RESPIRATION RATE: 18 BRPM | HEIGHT: 59 IN | HEART RATE: 88 BPM | WEIGHT: 85.1 LBS | OXYGEN SATURATION: 97 % | DIASTOLIC BLOOD PRESSURE: 73 MMHG | TEMPERATURE: 98 F | SYSTOLIC BLOOD PRESSURE: 135 MMHG
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
## 2021-10-10 DIAGNOSIS — T22.111A BURN OF FIRST DEGREE OF RIGHT FOREARM, INITIAL ENCOUNTER: ICD-10-CM
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
## 2021-10-10 DIAGNOSIS — Z88.0 ALLERGY STATUS TO PENICILLIN: ICD-10-CM

## 2021-10-10 DIAGNOSIS — Z98.890 OTHER SPECIFIED POSTPROCEDURAL STATES: Chronic | ICD-10-CM
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
## 2021-10-10 DIAGNOSIS — Z88.5 ALLERGY STATUS TO NARCOTIC AGENT: ICD-10-CM

## 2021-10-10 DIAGNOSIS — T22.211A BURN OF SECOND DEGREE OF RIGHT FOREARM, INITIAL ENCOUNTER: ICD-10-CM

## 2021-10-10 DIAGNOSIS — T31.0 BURNS INVOLVING LESS THAN 10% OF BODY SURFACE: ICD-10-CM

## 2021-10-10 PROCEDURE — 99285 EMERGENCY DEPT VISIT HI MDM: CPT

## 2021-10-10 PROCEDURE — 99283 EMERGENCY DEPT VISIT LOW MDM: CPT | Mod: 25

## 2021-10-10 PROCEDURE — 16020 DRESS/DEBRID P-THICK BURN S: CPT

## 2021-10-10 RX ADMIN — Medication 1 APPLICATION(S): at 17:09

## 2021-10-10 NOTE — ED PROVIDER NOTE - SKIN, MLM
right ext - 3cm area of second degree burn to dorsal forarm with some surrounding redness representing first degree burn , does not involve hand or fingers

## 2021-10-10 NOTE — ED PROVIDER NOTE - CLINICAL SUMMARY MEDICAL DECISION MAKING FREE TEXT BOX
Pt with mild second degree burn covering only 3cm of dorsal forearm and some first degree burn surrounding it - silvadene applied to area as well as gauze and dressing.  Pt received supplies for home for dressing changes.

## 2021-10-10 NOTE — ED PROVIDER NOTE - CROS ED SKIN ALL NEG
Operative Note      PATIENT NAME: Donna Da Silva RECORD NO. [id number]  DATE: 1/26/2021  SURGEON: Joss Nagel MD [facility]  PRIMARY CARE PHYSICIAN: Oscar Del Rosario MD     PREOPERATIVE DIAGNOSIS: Left inguinal hernia. POSTOPERATIVE DIAGNOSIS: Left direct inguinal hernia. PROCEDURE PERFORMED: Left inguinal hernia repair. SURGEON:  Dr. Ree Blackwell  ANESTHESIA:  general and Tap  ESTIMATED BLOOD LOSS: Minimal  SPECIMEN:  None. COMPLICATIONS:  None immediately appreciated. DISCUSSION: Candy Matos is a 70y.o.-year-old male who presented to my office and seen in evaluation at the request of Oscar Del Rosario MD regarding a left inguinal hernia. After history and physical examination was performed, potential diagnostic and therapeutic modalities discussed with the patient, operative and nonoperative management was discussed, risks, complications, and benefits reviewed. He was given the opportunity to ask questions, and once answered, informed consent was obtained. He was brought to the Operating Room on 1/26/2021 for the procedure. OPERATIVE FINDINGS: At the time of exploration, the patient had a direct large left inguinal hernia and a mesh plug repair was performed as described below. PROCEDURE:  The patient was brought to the Operating Room and placed in a supine position, placed under continuous cardiac telemetry, blood pressure, and pulse oximetry monitoring and placed under general by the Anesthesia Department. Preoperatively a TAP block was done by anesthesia. The anterior abdominal wall was prepped and draped in a sterile fashion. A time out called and the patient and the procedure were properly identified. A transverse incision was then made in the groin and carried down to subcutaneous tissues. Subcutaneous tissue dissection with electrocautery down to the underlying external oblique fascia. The external oblique was then opened through the external ring and the spermatic cord and its structures were identified. The cord structures were isolated with a Penrose drain. The patient was noted to have a direct left inguinal hernia. The hernia sac was dissected away from the spermatic cord and reduced into the floor the inguinal canal using an extra-large Perfix plug. The plug was sutured into place circumferentially using interrupted 2-0 Vicryl suture. An onlay patch was cut to fit the floor the inguinal canal and sutured medially and laterally with 2-0 Vicryl. No other pathology was identified. The spermatic cord and its structures were then placed back into the inguinal canal.  The external oblique was then closed using 2-0 Vicryl running suture. The subcutaneous tissue was closed using 3-0 Vicryl suture and the skin closed using 4-0 Monocryl suture in a running subcuticular fashion. Skin glue was applied to the skin edges. The patient was brought out of anesthesia, transferred to PACU in stable and condition. No immediate complication evident. All sponge, instrument and needle counts were correct at the completion of the procedure. Operative findings were discussed with the patient's wife by phone. He was given discharge instructions, prescription for analgesics and will follow up in my office in a 2 weeks period of time for reevaluation.       Electronically signed by Elizabeth Hazel MD on 1/26/21 at 12:45 PM EST - - -

## 2021-10-10 NOTE — ED PROVIDER NOTE - PATIENT PORTAL LINK FT
You can access the FollowMyHealth Patient Portal offered by [facility] by registering at the following website: [email or web address]. By joining [employer]’s FollowMyHealth portal, you will also be able to view your health information using other applications (apps) compatible with our system.

## 2021-10-10 NOTE — ED ADULT TRIAGE NOTE - CHIEF COMPLAINT QUOTE
Pt presents to ED C/O itchy, superficial burn to R forearm. Pt states, " I burned my arm while cooking yesterday" Denies pain.

## 2021-10-10 NOTE — ED PROVIDER NOTE - NSFOLLOWUPINSTRUCTIONS_ED_ALL_ED_FT
Continue silvadene and dressing over forearm twice daily.    Return to ED with any evidence of infection including fever, drainage around burn.        WHAT YOU NEED TO KNOW:    A superficial burn, or first-degree burn, is a burn that affects only the outer layer of skin. The skin may be red, dry, or tender. The area may swell or turn white when touched.    DISCHARGE INSTRUCTIONS:    Call your local emergency number (911 in the [place]) if:   •You have trouble breathing.          Seek care immediately if:   •You have increased redness, numbness, or swelling in the burn area.      •You have red streaks or blisters spreading outward from the burn area.      •Your pain is not relieved, or is getting worse even after you take medicine.      Call your doctor if:   •You have a fever.      •You have questions or concerns about your condition or care.      Medicines:   •NSAIDs, such as ibuprofen, help decrease swelling, pain, and fever. NSAIDs can cause stomach bleeding or kidney problems in certain people. If you take blood thinner medicine, always ask your healthcare provider if NSAIDs are safe for you. Always read the medicine label and follow directions.      •Acetaminophen decreases pain and fever. It is available without a doctor's order. Ask how much to take and how often to take it. Follow directions. Read the labels of all other medicines you are using to see if they also contain acetaminophen, or ask your doctor or pharmacist. Acetaminophen can cause liver damage if not taken correctly. Do not use more than 4 grams (4,000 milligrams) total of acetaminophen in one day.       •Take your medicine as directed. Contact your healthcare provider if you think your medicine is not helping or if you have side effects. Tell him or her if you are allergic to any medicine. Keep a list of the medicines, vitamins, and herbs you take. Include the amounts, and when and why you take them. Bring the list or the pill bottles to follow-up visits. Carry your medicine list with you in case of an emergency.      First aid for a superficial burn: A superficial burn usually heals in 5 to 7 days without scarring or blisters. Use the following first-aid guide to treat your burn:   •Remove clothing and jewelry from the burn area immediately.      •Flush liquid chemicals from your skin completely with large amounts of cool running water. Do not splash the chemicals into your eyes.      •Brush dry chemicals off your skin if large amounts of water are not available. Small amounts of water will activate some chemicals and cause more damage.       •Run cool or cold water over the burned area for 10 minutes. A cold or cool compress can also be put on the burn. Do not use ice or ice water on the affected area. Ice may cause more skin damage.      •Soothe the skin with an antibacterial ointment or skin cream, such as aloe vera cream. Do not use butter, petroleum jelly, or other home remedies.      •Do not put a bandage on the burn until you are told to do so by your healthcare provider.      Prevent superficial burns:   •Do not leave cups, mugs, or bowls containing hot liquids at the edge of a table. Keep pot handles turned away from the stove front.      •Do not leave a lit cigarette. Make sure it is no longer lit. Then dispose of it safely.      •Store dangerous items out of the reach of children. Store cigarette lighters, matches, and chemicals where children cannot reach them. Use child safety latches on the door of the safe storage area.  Common Childproofing Latches            •Keep your water heater setting to low or medium (90°F to 120°F, or 32°C to 48°C).      •Wear sunscreen that has a sun protectant factor (SPF) of 15 or higher. The sunscreen should also have ultraviolet A (UVA) and ultraviolet B (UVB) protection. Follow the directions on the label when you use sunscreen. Put on more sunscreen if you are in the sun for more than an hour. Reapply sunscreen often if you go swimming or are sweating.      Follow up with your doctor as directed: Write down your questions so you remember to ask them during your visits.       © Copyright [employer] 2021           back to top                          © Copyright [employer] 2021

## 2021-10-10 NOTE — ED PROVIDER NOTE - OBJECTIVE STATEMENT
87 y/o f presents with burn to right forearm s/p hitting hot stove while cooking.  Denies other injury - pt otherwise healthy with no sig PMH.

## 2021-10-10 NOTE — ED ADULT NURSE NOTE - NS ED NURSE LEVEL OF CONSCIOUSNESS SPEECH
----- Message from Sunshine Mcleod sent at 12/12/2018 10:00 AM CST -----  Contact: Self   Pt is requesting a call back in regards to scheduling her annual appt.       Pt can be contacted at [phone]  
Attempted  To return pt call no answer left message for her to contact the office.  
Speaking Coherently

## 2021-10-18 ENCOUNTER — NON-APPOINTMENT (OUTPATIENT)
Age: 86
End: 2021-10-18

## 2021-10-18 ENCOUNTER — APPOINTMENT (OUTPATIENT)
Dept: NEUROLOGY | Facility: CLINIC | Age: 86
End: 2021-10-18
Payer: MEDICARE

## 2021-10-18 VITALS
OXYGEN SATURATION: 93 % | BODY MASS INDEX: 14.72 KG/M2 | WEIGHT: 75 LBS | HEIGHT: 60 IN | TEMPERATURE: 97.6 F | HEART RATE: 76 BPM | SYSTOLIC BLOOD PRESSURE: 150 MMHG | DIASTOLIC BLOOD PRESSURE: 75 MMHG

## 2021-10-18 DIAGNOSIS — R41.89 OTHER SYMPTOMS AND SIGNS INVOLVING COGNITIVE FUNCTIONS AND AWARENESS: ICD-10-CM

## 2021-10-18 DIAGNOSIS — F51.04 PSYCHOPHYSIOLOGIC INSOMNIA: ICD-10-CM

## 2021-10-18 DIAGNOSIS — R46.89 OTHER SYMPTOMS AND SIGNS INVOLVING COGNITIVE FUNCTIONS AND AWARENESS: ICD-10-CM

## 2021-10-18 DIAGNOSIS — F03.90 UNSPECIFIED DEMENTIA W/OUT BEHAVIORAL DISTURBANCE: ICD-10-CM

## 2021-10-18 PROCEDURE — 99205 OFFICE O/P NEW HI 60 MIN: CPT

## 2021-10-20 PROBLEM — F51.04 CHRONIC INSOMNIA: Status: ACTIVE | Noted: 2019-05-06

## 2021-10-30 ENCOUNTER — EMERGENCY (EMERGENCY)
Facility: HOSPITAL | Age: 86
LOS: 1 days | Discharge: HOME | End: 2021-10-30
Attending: EMERGENCY MEDICINE | Admitting: EMERGENCY MEDICINE
Payer: MEDICARE

## 2021-10-30 VITALS
RESPIRATION RATE: 18 BRPM | HEART RATE: 95 BPM | DIASTOLIC BLOOD PRESSURE: 92 MMHG | TEMPERATURE: 98 F | OXYGEN SATURATION: 94 % | SYSTOLIC BLOOD PRESSURE: 165 MMHG

## 2021-10-30 VITALS
HEART RATE: 80 BPM | HEIGHT: 59 IN | WEIGHT: 85.1 LBS | RESPIRATION RATE: 18 BRPM | TEMPERATURE: 98 F | DIASTOLIC BLOOD PRESSURE: 80 MMHG | SYSTOLIC BLOOD PRESSURE: 135 MMHG | OXYGEN SATURATION: 95 %

## 2021-10-30 DIAGNOSIS — R21 RASH AND OTHER NONSPECIFIC SKIN ERUPTION: ICD-10-CM

## 2021-10-30 DIAGNOSIS — Z88.5 ALLERGY STATUS TO NARCOTIC AGENT: ICD-10-CM

## 2021-10-30 DIAGNOSIS — Z98.890 OTHER SPECIFIED POSTPROCEDURAL STATES: Chronic | ICD-10-CM

## 2021-10-30 DIAGNOSIS — Z20.822 CONTACT WITH AND (SUSPECTED) EXPOSURE TO COVID-19: ICD-10-CM

## 2021-10-30 DIAGNOSIS — Z88.0 ALLERGY STATUS TO PENICILLIN: ICD-10-CM

## 2021-10-30 DIAGNOSIS — L29.9 PRURITUS, UNSPECIFIED: ICD-10-CM

## 2021-10-30 LAB
ALBUMIN SERPL ELPH-MCNC: 4.5 G/DL — SIGNIFICANT CHANGE UP (ref 3.3–5)
ALP SERPL-CCNC: 78 U/L — SIGNIFICANT CHANGE UP (ref 40–120)
ALT FLD-CCNC: 17 U/L — SIGNIFICANT CHANGE UP (ref 10–45)
ANION GAP SERPL CALC-SCNC: 9 MMOL/L — SIGNIFICANT CHANGE UP (ref 5–17)
AST SERPL-CCNC: 26 U/L — SIGNIFICANT CHANGE UP (ref 10–40)
BILIRUB SERPL-MCNC: 0.5 MG/DL — SIGNIFICANT CHANGE UP (ref 0.2–1.2)
BUN SERPL-MCNC: 19 MG/DL — SIGNIFICANT CHANGE UP (ref 7–23)
CALCIUM SERPL-MCNC: 9.2 MG/DL — SIGNIFICANT CHANGE UP (ref 8.4–10.5)
CHLORIDE SERPL-SCNC: 109 MMOL/L — HIGH (ref 96–108)
CO2 SERPL-SCNC: 27 MMOL/L — SIGNIFICANT CHANGE UP (ref 22–31)
CREAT SERPL-MCNC: 0.67 MG/DL — SIGNIFICANT CHANGE UP (ref 0.5–1.3)
GLUCOSE SERPL-MCNC: 84 MG/DL — SIGNIFICANT CHANGE UP (ref 70–99)
HCT VFR BLD CALC: 39.6 % — SIGNIFICANT CHANGE UP (ref 34.5–45)
HGB BLD-MCNC: 12.7 G/DL — SIGNIFICANT CHANGE UP (ref 11.5–15.5)
MCHC RBC-ENTMCNC: 29 PG — SIGNIFICANT CHANGE UP (ref 27–34)
MCHC RBC-ENTMCNC: 32.1 GM/DL — SIGNIFICANT CHANGE UP (ref 32–36)
MCV RBC AUTO: 90.4 FL — SIGNIFICANT CHANGE UP (ref 80–100)
NRBC # BLD: 0 /100 WBCS — SIGNIFICANT CHANGE UP (ref 0–0)
PLATELET # BLD AUTO: 305 K/UL — SIGNIFICANT CHANGE UP (ref 150–400)
POTASSIUM SERPL-MCNC: 4.1 MMOL/L — SIGNIFICANT CHANGE UP (ref 3.5–5.3)
POTASSIUM SERPL-SCNC: 4.1 MMOL/L — SIGNIFICANT CHANGE UP (ref 3.5–5.3)
PROT SERPL-MCNC: 7.5 G/DL — SIGNIFICANT CHANGE UP (ref 6–8.3)
RBC # BLD: 4.38 M/UL — SIGNIFICANT CHANGE UP (ref 3.8–5.2)
RBC # FLD: 14.4 % — SIGNIFICANT CHANGE UP (ref 10.3–14.5)
SARS-COV-2 RNA SPEC QL NAA+PROBE: NEGATIVE — SIGNIFICANT CHANGE UP
SODIUM SERPL-SCNC: 145 MMOL/L — SIGNIFICANT CHANGE UP (ref 135–145)
WBC # BLD: 8.52 K/UL — SIGNIFICANT CHANGE UP (ref 3.8–10.5)
WBC # FLD AUTO: 8.52 K/UL — SIGNIFICANT CHANGE UP (ref 3.8–10.5)

## 2021-10-30 PROCEDURE — 87635 SARS-COV-2 COVID-19 AMP PRB: CPT

## 2021-10-30 PROCEDURE — 99283 EMERGENCY DEPT VISIT LOW MDM: CPT

## 2021-10-30 PROCEDURE — 80053 COMPREHEN METABOLIC PANEL: CPT

## 2021-10-30 PROCEDURE — 36415 COLL VENOUS BLD VENIPUNCTURE: CPT

## 2021-10-30 PROCEDURE — 85027 COMPLETE CBC AUTOMATED: CPT

## 2021-10-30 PROCEDURE — 99284 EMERGENCY DEPT VISIT MOD MDM: CPT

## 2021-10-30 RX ORDER — HYDROXYZINE HCL 10 MG
1 TABLET ORAL
Qty: 15 | Refills: 0
Start: 2021-10-30

## 2021-10-30 RX ORDER — CALAMINE AND ZINC OXIDE AND PHENOL 160; 10 MG/ML; MG/ML
1 LOTION TOPICAL ONCE
Refills: 0 | Status: COMPLETED | OUTPATIENT
Start: 2021-10-30 | End: 2021-10-30

## 2021-10-30 RX ORDER — CEPHALEXIN 500 MG
1 CAPSULE ORAL
Qty: 28 | Refills: 0
Start: 2021-10-30 | End: 2021-11-05

## 2021-10-30 RX ADMIN — CALAMINE AND ZINC OXIDE AND PHENOL 1 APPLICATION(S): 160; 10 LOTION TOPICAL at 20:59

## 2021-10-30 NOTE — ED PROVIDER NOTE - PATIENT PORTAL LINK FT
You can access the FollowMyHealth Patient Portal offered by [facility] by registering at the following website: [email or web address]. By joining [employer]’s FollowMyHealth portal, you will also be able to view your health information using other applications (apps) compatible with our system.

## 2021-10-30 NOTE — ED PROVIDER NOTE - OBJECTIVE STATEMENT
85 yo hx of htn presenting with rash for the last three months. Initially started on neck and arm, yesterday developed on legs b/l. Denies fever, +itching. No oral lesions/hand/feet. Has had seen prior dermatologists, EDs for rash. On cetirizine, pepcid.

## 2021-10-30 NOTE — ED PROVIDER NOTE - TOBACCO USE
Unknown if ever smoked [FreeTextEntry1] : pregnancy precautions reviewed. advised to take pnv. f/u 2 weeks for nob or prn. by reina 10.1 with edc 1/11/22\par advised pt that she needs to stop smoking cigarettes and marijuana

## 2021-10-30 NOTE — ED PROVIDER NOTE - CLINICAL SUMMARY MEDICAL DECISION MAKING FREE TEXT BOX
Diffuse scaling rash to neck, hand, and now inner thighs ongoing for many months, afebrile, hds, itching, cbc drawn w eosinophilia, will disc w derm and dispo given extent and age.

## 2021-10-30 NOTE — ED ADULT TRIAGE NOTE - CHIEF COMPLAINT QUOTE
Pt endorses Rash to right arm and b/l thighs x2 months. Pt with dry, scaling, itchy, serous-weeping rash. Endorses scratching until it bleeds at times.  Denies fevers. Unable to visualize leg rash in triage.

## 2021-10-30 NOTE — ED PROVIDER NOTE - NSFOLLOWUPINSTRUCTIONS_ED_ALL_ED_FT
Take medications as prescribed, also can take    over the counter  Cetaphil cream  Solomon cream  benadryl/hydroxyzine as needed at bedtime    Can take tylenol or motrin every 6hrs as needed for mild pain.  Can take benadryl 25-50mg every 6hrs as needed for itching (caution: may cause lightheaded!).  Take prednisone as prescribed.   Stay well hydrated.  Return for fevers, persistent vomit, uncontrolled pain, worsening breathing, worsening lightheaded, spreading redness.  Follow up with primary doctor within 1-2 days.   Follow up with dermatologist. Can call [phone] OR can visit [email or web address] to schedule appointment.     Rash, Adult    A rash is a change in the color of the skin. A rash can also change the way your skin feels. There are many different conditions and factors that can cause a rash. Some rashes may disappear after a few days, but some may last for a couple of weeks. Common causes of rashes include:  Viral infections, such as:  Colds.  Measles.  Hand, foot, and mouth disease.  Bacterial infections, such as:  Scarlet fever.  Impetigo.  Fungal infections, such as Candida.  Allergic reactions to food, medicines, or skin care products.    Follow these instructions at home:  The goal of treatment is to stop the itching and keep the rash from spreading. Pay attention to any changes in your symptoms.     Follow these instructions to help with your condition:     Take or apply over-the-counter and prescription medicines only as told by your health care provider. These may include:  Corticosteroid cream.  Anti-itch lotions.  Oral antihistamines.    Skin Care  Apply cool compresses to the affected areas.  Try taking a bath with:  Epsom salts. Follow the instructions on the packaging. You can get these at your local pharmacy or grocery store.  Baking soda. Pour a small amount into the bath as told by your health care provider.  Colloidal oatmeal. Follow the instructions on the packaging. You can get this at your local pharmacy or grocery store.  Try applying baking soda paste to your skin. Stir water into baking soda until it reaches a paste-like consistency.  Do not scratch or rub your skin.  Avoid covering the rash. Make sure the rash is exposed to air as much as possible.    General instructions  Avoid hot showers or baths, which can make itching worse. A cold shower may help.  Avoid scented soaps, detergents, and perfumes. Use gentle soaps, detergents, perfumes, and other cosmetic products.  Avoid any substance that causes your rash. Keep a journal to help track what causes your rash. Write down:  What you eat.  What cosmetic products you use.  What you drink.  What you wear. This includes jewelry.  Keep all follow-up visits as told by your health care provider. This is important.    Contact a health care provider if:  You sweat at night.  You lose weight.  You urinate more than normal.  You feel weak.  You vomit.  Your skin or the whites of your eyes look yellow (jaundice).  Your skin:  Tingles.  Is numb.  Your rash:  Does not go away after several days.  Gets worse.  You are:  Unusually thirsty.  More tired than normal.  You have:  New symptoms.  Pain in your abdomen.  A fever.  Diarrhea.  Get help right away if:  You develop a rash that covers all or most of your body. The rash may or may not be painful.  You develop blisters that:  Are on top of the rash.  Grow larger or grow together.  Are painful.  Are inside your nose or mouth.  You develop a rash that:  Looks like purple pinprick-sized spots all over your body.  Has a "bull's eye" or looks like a target.  Is not related to sun exposure, is red and painful, and causes your skin to peel.

## 2021-10-30 NOTE — ED ADULT NURSE REASSESSMENT NOTE - NS ED NURSE REASSESS COMMENT FT1
Pt received resting comfortably on stretcher, no distress noted, spouse at bedside. Safety precautions in place, will continue to monitor.

## 2021-10-30 NOTE — ED ADULT NURSE NOTE - OBJECTIVE STATEMENT
Pt AOX4. Pt c/o red dry rash to R upper arm, b/l inner thighs and neck x2 months. Pt with dry, scaling, itchy, serous-weeping rash. Pt denies fevers, chills, n/v, SOB, chest pain, numbness, tingling, weakness, dizziness. Pt speaking in full complete sentences. Respirations even and unlabored.

## 2021-10-30 NOTE — ED PROVIDER NOTE - PHYSICAL EXAMINATION
CONSTITUTIONAL: Awake, alert and in no apparent distress.  HEENT: Head is atraumatic. Eyes clear bilaterally, normal EOMI. Airway patent.  CARDIAC: Normal rate, regular rhythm.  Heart sounds S1, S2.   RESPIRATORY: Breath sounds clear and equal bilaterally. no tachypnea, respiratory distress.   GASTROINTESTINAL: Abdomen soft, non-tender, no guarding, distension.  MUSCULOSKELETAL: Spine appears normal, no midline spinal tenderness, range of motion is not limited, no muscle or joint tenderness. no bony tenderness.   NEUROLOGICAL: Alert, no focal deficits, no motor or sensory deficits.  SKIN: Skin normal color for race, warm, dry and intact. No evidence of rash.  PSYCHIATRIC: Normal mood and affect. no apparent risk to self or others. CONSTITUTIONAL: Awake, alert and in no apparent distress.  HEENT: Head is atraumatic. Eyes clear bilaterally, normal EOMI. Airway patent.  CARDIAC: Normal rate, regular rhythm.  Heart sounds S1, S2.   RESPIRATORY: Breath sounds clear and equal bilaterally. no tachypnea, respiratory distress.   GASTROINTESTINAL: Abdomen soft, non-tender, no guarding, distension.  MUSCULOSKELETAL: Spine appears normal, no midline spinal tenderness, range of motion is not limited, no muscle or joint tenderness. no bony tenderness.   NEUROLOGICAL: Alert, no focal deficits, no motor or sensory deficits.  SKIN: Skin normal color for race, warm, dry and intact. diffuse erythema w scaling lesions to neck and R arm. erythema to inner thighs. no oral lesions or on hand/feet  PSYCHIATRIC: Normal mood and affect. no apparent risk to self or others.

## 2021-10-30 NOTE — ED PROVIDER NOTE - PROGRESS NOTE DETAILS
discussed with Ankur Browne of dermatology, appearance of diffuse ezcema, w mild superimposed infection, rec Betamethasone, Keflex

## 2021-11-04 ENCOUNTER — OUTPATIENT (OUTPATIENT)
Dept: OUTPATIENT SERVICES | Facility: HOSPITAL | Age: 86
LOS: 1 days | End: 2021-11-04
Payer: MEDICARE

## 2021-11-04 ENCOUNTER — APPOINTMENT (OUTPATIENT)
Dept: MRI IMAGING | Facility: HOSPITAL | Age: 86
End: 2021-11-04

## 2021-11-04 DIAGNOSIS — Z98.890 OTHER SPECIFIED POSTPROCEDURAL STATES: Chronic | ICD-10-CM

## 2021-11-04 PROCEDURE — 70551 MRI BRAIN STEM W/O DYE: CPT | Mod: 26

## 2021-11-04 PROCEDURE — 70551 MRI BRAIN STEM W/O DYE: CPT

## 2021-11-06 ENCOUNTER — TRANSCRIPTION ENCOUNTER (OUTPATIENT)
Age: 86
End: 2021-11-06

## 2021-11-24 ENCOUNTER — NON-APPOINTMENT (OUTPATIENT)
Age: 86
End: 2021-11-24

## 2021-12-05 ENCOUNTER — TRANSCRIPTION ENCOUNTER (OUTPATIENT)
Age: 86
End: 2021-12-05

## 2021-12-08 ENCOUNTER — APPOINTMENT (OUTPATIENT)
Dept: NEUROLOGY | Facility: CLINIC | Age: 86
End: 2021-12-08

## 2022-01-17 ENCOUNTER — EMERGENCY (EMERGENCY)
Facility: HOSPITAL | Age: 87
LOS: 1 days | Discharge: HOME | End: 2022-01-17
Admitting: EMERGENCY MEDICINE
Payer: MEDICARE

## 2022-01-17 VITALS
HEART RATE: 75 BPM | OXYGEN SATURATION: 100 % | RESPIRATION RATE: 18 BRPM | DIASTOLIC BLOOD PRESSURE: 84 MMHG | SYSTOLIC BLOOD PRESSURE: 131 MMHG | TEMPERATURE: 98 F

## 2022-01-17 VITALS
RESPIRATION RATE: 16 BRPM | TEMPERATURE: 98 F | DIASTOLIC BLOOD PRESSURE: 90 MMHG | HEART RATE: 86 BPM | OXYGEN SATURATION: 95 % | SYSTOLIC BLOOD PRESSURE: 182 MMHG | HEIGHT: 59 IN

## 2022-01-17 DIAGNOSIS — M54.2 CERVICALGIA: ICD-10-CM

## 2022-01-17 DIAGNOSIS — I10 ESSENTIAL (PRIMARY) HYPERTENSION: ICD-10-CM

## 2022-01-17 DIAGNOSIS — Z98.890 OTHER SPECIFIED POSTPROCEDURAL STATES: Chronic | ICD-10-CM

## 2022-01-17 DIAGNOSIS — Z88.5 ALLERGY STATUS TO NARCOTIC AGENT: ICD-10-CM

## 2022-01-17 DIAGNOSIS — Z88.0 ALLERGY STATUS TO PENICILLIN: ICD-10-CM

## 2022-01-17 DIAGNOSIS — E78.5 HYPERLIPIDEMIA, UNSPECIFIED: ICD-10-CM

## 2022-01-17 PROCEDURE — G1004: CPT

## 2022-01-17 PROCEDURE — 99284 EMERGENCY DEPT VISIT MOD MDM: CPT | Mod: 25

## 2022-01-17 PROCEDURE — 72125 CT NECK SPINE W/O DYE: CPT | Mod: MG

## 2022-01-17 PROCEDURE — 93005 ELECTROCARDIOGRAM TRACING: CPT

## 2022-01-17 PROCEDURE — 99284 EMERGENCY DEPT VISIT MOD MDM: CPT

## 2022-01-17 PROCEDURE — 93010 ELECTROCARDIOGRAM REPORT: CPT

## 2022-01-17 PROCEDURE — 72125 CT NECK SPINE W/O DYE: CPT | Mod: 26,MG

## 2022-01-17 RX ORDER — CYCLOBENZAPRINE HYDROCHLORIDE 10 MG/1
1 TABLET, FILM COATED ORAL
Qty: 14 | Refills: 0
Start: 2022-01-17 | End: 2022-01-23

## 2022-01-17 RX ORDER — LIDOCAINE 4 G/100G
1 CREAM TOPICAL ONCE
Refills: 0 | Status: COMPLETED | OUTPATIENT
Start: 2022-01-17 | End: 2022-01-17

## 2022-01-17 RX ADMIN — LIDOCAINE 1 PATCH: 4 CREAM TOPICAL at 16:49

## 2022-01-17 RX ADMIN — Medication 500 MILLIGRAM(S): at 16:49

## 2022-01-17 RX ADMIN — Medication 500 MILLIGRAM(S): at 17:42

## 2022-01-17 NOTE — ED PROVIDER NOTE - NSFOLLOWUPINSTRUCTIONS_ED_ALL_ED_FT
Please take medication as directed and follow up with your PMD and spine. Return to the Emergency Department if you have any new or worsening symptoms, or if you have any concerns.    Please reach out to Юлия Gilman ([facility] ED clinical referral coordinator) to assist you with your follow-up appointment.     Monday - Friday 11am-7pm  [phone]  [email or web address]    Neck Pain    WHAT YOU NEED TO KNOW:    You may have sudden neck pain that increases quickly. You may instead feel pain build slowly over time. Neck pain may go away in a few days or weeks, or it may continue for months. The pain may come and go, or be worse with certain movements. The pain may be only in your neck, or it may move to your arms, back, or shoulders. You may also have pain that starts in another body area and moves to your neck. Some types of neck pain are permanent.    Vertebral Column         DISCHARGE INSTRUCTIONS:    Return to the emergency department if:   •You have an injury that causes neck pain and shooting pain down your arms or legs.      •Your neck pain suddenly becomes severe.      •You have neck pain along with numbness, tingling, or weakness in your arms or legs.      •You have a stiff neck, a headache, and a fever.      Contact your healthcare provider if:   •You have new or worsening symptoms.      •Your symptoms continue even after treatment.      •You have questions or concerns about your condition or care.      Medicines: You may need any of the following:   •NSAIDs , such as ibuprofen, help decrease swelling, pain, and fever. This medicine is available without a doctor's order. Ask your healthcare provider which medicine to take and how often to take it. Follow directions. NSAIDs can cause stomach bleeding or kidney problems if not taken correctly. If you take blood thinner medicine, always ask if NSAIDs are safe for you.      •Acetaminophen helps decrease pain and fever. Ask your healthcare provider how much to take and how often to take it. Follow directions. Acetaminophen can cause liver damage if not taken correctly.      •Steroid medicine may be used to reduce inflammation. This can help relieve pain caused by swelling.      •Take your medicine as directed. Contact your healthcare provider if you think your medicine is not helping or if you have side effects. Tell him or her if you are allergic to any medicine. Keep a list of the medicines, vitamins, and herbs you take. Include the amounts, and when and why you take them. Bring the list or the pill bottles to follow-up visits. Carry your medicine list with you in case of an emergency.      Manage or prevent neck pain:   •Rest your neck as directed. Do not make sudden movements, such as turning your head quickly. Your healthcare provider may recommend you wear a cervical collar for a short time. The collar will prevent you from moving your head. This will give your neck time to heal if an injury is causing your neck pain. Ask your healthcare provider when you can return to sports or other normal daily activities.      •Apply heat as directed. Heat helps relieve pain and swelling. Use a heat wrap, or soak a small towel in warm water. Wring out the extra water. Apply the heat wrap or towel for 20 minutes every hour, or as directed.      •Apply ice as directed. Ice helps relieve pain and swelling, and can help prevent tissue damage. Use an ice pack, or put ice in a bag. Cover the ice pack or back with a towel before you apply it to your neck. Apply the ice pack or ice for 15 minutes every hour, or as directed. Your healthcare provider can tell you how often to apply ice.      •Do neck exercises as directed. Neck exercises help strengthen the muscles and increase range of motion. Your healthcare provider will tell you which exercises are right for you. He may give you instructions, or he may recommend that you work with a physical therapist. Your healthcare provider or therapist can make sure you are doing the exercises correctly.       •Maintain good posture. Try to keep your head and shoulders lifted when you sit. If you work in front of a computer, make sure the monitor is at the right level. You should not need to look up down to see the screen. You should also not have to lean forward to be able to read what is on the screen. Make sure your keyboard, mouse, and other computer items are placed where you do not have to extend your shoulder to reach them. Get up often if you work in front of a computer or sit for long periods of time. Stretch or walk around to keep your neck muscles loose.      Follow up with your healthcare provider as directed: Your healthcare provider may refer you to a specialist if your pain does not get better with treatment. Write down your questions so you remember to ask them during your visits.       © Copyright [employer] 2022           back to top                          © Copyright [employer] 2022

## 2022-01-17 NOTE — ED PROVIDER NOTE - PHYSICAL EXAMINATION
GENERAL: WD/WN, in NAD  NEURO: Alert and oriented. CN II-XII intact. Motor strength 5/5 in all extremities.  SILT in all extremities.  Symmetric DTR  +2 bilaterally in biceps, BR, patellar.  HEAD: NC/AT  EYES: Clear bilaterally; Pupils equal and round  ENT: OP clear, no rhinorrhea  PULM: No signs of respiratory distress; lungs clear bilaterally  CV: RRR, S1S2, No MRG. Symmetric distal pulses bilaterally.  GI: Abdomen soft, nontender.  No abdominal masses or abnormal pulsations appreciated.    SKIN: normal color and turgor; no rash or lesions  MSK: good rom, no spinal ttp, no step off

## 2022-01-17 NOTE — ED PROVIDER NOTE - PATIENT PORTAL LINK FT
You can access the FollowMyHealth Patient Portal offered by [facility] by registering at the following website: [email or web address]. By joining [employer]s FollowMyHealth portal, you will also be able to view your health information using other applications (apps) compatible with our system.

## 2022-01-17 NOTE — ED ADULT NURSE NOTE - OBJECTIVE STATEMENT
Pt is an 87 y/o female A&O accompanied by HHA c/o left sided neck pain starting today. Pt endorses pain worse with movement. Pt denies fall/injury, h/a. Pt talking in clear, full sentences, respirations even and unlabored.

## 2022-01-17 NOTE — ED PROVIDER NOTE - OBJECTIVE STATEMENT
87 y/o female with a PMHx of HTN and HLD is here in the ED c/o atraumatic left neck pain x1 day. Pt states she woke up this morning and then started to develop left sided neck pain. The pain is on the left side of her neck which is constant and increases with movement. She denies any associating symptoms. She self-medicated with tylenol x2 hours prior to ED arrival with improvement. She denies the following: recent injury, fall, HA, dizziness, blurred vision, double vision, rash, otalgia, hearing loss, tinnitus, neck stiffness/swelling, recent illness, weakness, numbness/tingling to extremities, chest pain, sob, difficulty breathing, recent chiropractic neck manipulation, confusion, slurred speech, difficulty or painful swallowing, cough, sore throat.

## 2022-01-17 NOTE — ED PROVIDER NOTE - CLINICAL SUMMARY MEDICAL DECISION MAKING FREE TEXT BOX
85 y/o female here in the ED c/o atraumatic left neck pain with good rom and NVI. 85 y/o female here in the ED c/o atraumatic left neck pain with good rom and NVI. EKG without acute ischemia and no changes from previous ekg conducted 8/15/2020 85 y/o female here in the ED c/o atraumatic left neck pain with good rom and NVI. EKG without acute ischemia and no changes from previous ekg conducted 8/15/2020. No chest pain or sob do not suspect cardiac. No confusion, slurred speech, HA, dizziness or difficulty walking, do not suspect tia/cva. Given pain is reproducible with movement, likely msk. Pain treated and improved. CT cervical negative for acute fx. Advised to follow-up with ortho spine. I have discussed the discharge plan with the patient. The patient agrees with the plan, as discussed.  The patient understands Emergency Department diagnosis is a preliminary diagnosis often based on limited information and that the patient must adhere to the follow-up plan as discussed.  The patient understands that if the symptoms worsen or if prescribed medications do not have the desired/planned effect that the patient may return to the Emergency Department at any time for further evaluation and treatment.

## 2022-01-17 NOTE — ED PROVIDER NOTE - CARE PROVIDER_API CALL
Onur Cabrera  ORTHOPAEDIC SURGERY  [address], [address]  [place], [place] [address]  Phone: [phone]  Fax: [phone]  Follow Up Time:

## 2022-01-17 NOTE — ED ADULT TRIAGE NOTE - CHIEF COMPLAINT QUOTE
"HEMODIALYSIS TREATMENT NOTE    Date: 1/21/2019  Time: 4:33 PM    Data:  Pre Wt:  71.6   Desired Wt: 70.6 kg   Post Wt:  71.4  Weight gain:   0kg   Weight change:-0.2   kg  Ultrafiltration - Post Run Net Total Removed (mL): 113 mL  Ultrafiltration - Post Run Net Total Gain (mL): 0 mL  Vascular Access Status: Yes, secured and visible  Dialyzer Rinse: Light, Clear  Total Blood Volume Processed: 38.8  Total Dialysis (Treatment) Time: 2.5hrs     Lab:   Yes blue top for INR sent as request from primary RN    Interventions: Pt's goal and plan changed 1 hr into treatment due to low BP near parameter. Per renal team Uf turned off and plan changed to 2.5hrs of dialysis. BS checked and found to be 147 Handoff given to Leslie Madrid RN.      Assessment:  Pt intubated but alert able to make eye contact and track writter across the room. When family was visiting patient was able to nod \"yes\" and \"no\" at certain times but not always consistently. Pt does not appear fluid overloaded. And nodded \"no\" when asking if she had pain. Pt tolerated run with no Uf well. When pulling fluid pt was near BP parameter. Vital signs otherwise stable through out.     Plan:    Will continue to monitor and follow POC per renal team.    " Patient c/o left ear and left sided neck pain since she woke up this morning, "I have pain when turning my neck."  Home health aide present.

## 2022-01-17 NOTE — ED ADULT NURSE NOTE - CHIEF COMPLAINT QUOTE
Patient c/o left ear and left sided neck pain since she woke up this morning, "I have pain when turning my neck."  Home health aide present.

## 2022-01-17 NOTE — ED ADULT NURSE NOTE - NSIMPLEMENTINTERV_GEN_ALL_ED
Implemented All Fall with Harm Risk Interventions:  Berry to call system. Call bell, personal items and telephone within reach. Instruct patient to call for assistance. Room bathroom lighting operational. Non-slip footwear when patient is off stretcher. Physically safe environment: no spills, clutter or unnecessary equipment. Stretcher in lowest position, wheels locked, appropriate side rails in place. Provide visual cue, wrist band, yellow gown, etc. Monitor gait and stability. Monitor for mental status changes and reorient to person, place, and time. Review medications for side effects contributing to fall risk. Reinforce activity limits and safety measures with patient and family. Provide visual clues: red socks.

## 2022-02-03 ENCOUNTER — TRANSCRIPTION ENCOUNTER (OUTPATIENT)
Age: 87
End: 2022-02-03

## 2022-02-04 ENCOUNTER — APPOINTMENT (OUTPATIENT)
Dept: NEUROLOGY | Facility: CLINIC | Age: 87
End: 2022-02-04
Payer: MEDICARE

## 2022-02-04 VITALS
DIASTOLIC BLOOD PRESSURE: 70 MMHG | HEIGHT: 60 IN | OXYGEN SATURATION: 92 % | BODY MASS INDEX: 16.1 KG/M2 | WEIGHT: 82 LBS | TEMPERATURE: 97.8 F | HEART RATE: 97 BPM | SYSTOLIC BLOOD PRESSURE: 160 MMHG

## 2022-02-04 DIAGNOSIS — M25.549 PAIN IN JOINTS OF UNSPECIFIED HAND: ICD-10-CM

## 2022-02-04 PROCEDURE — 99215 OFFICE O/P EST HI 40 MIN: CPT

## 2022-02-04 RX ORDER — GABAPENTIN 100 MG/1
100 CAPSULE ORAL TWICE DAILY
Qty: 180 | Refills: 3 | Status: ACTIVE | COMMUNITY
Start: 2021-10-18 | End: 1900-01-01

## 2022-02-04 RX ORDER — ASPIRIN ENTERIC COATED TABLETS 81 MG 81 MG/1
81 TABLET, DELAYED RELEASE ORAL
Refills: 0 | Status: ACTIVE | COMMUNITY
Start: 2022-02-04

## 2022-03-12 ENCOUNTER — EMERGENCY (EMERGENCY)
Facility: HOSPITAL | Age: 87
LOS: 1 days | Discharge: HOME | End: 2022-03-12
Attending: EMERGENCY MEDICINE | Admitting: EMERGENCY MEDICINE
Payer: MEDICARE

## 2022-03-12 VITALS
TEMPERATURE: 98 F | RESPIRATION RATE: 24 BRPM | HEIGHT: 59 IN | WEIGHT: 85.98 LBS | DIASTOLIC BLOOD PRESSURE: 98 MMHG | HEART RATE: 88 BPM | OXYGEN SATURATION: 95 % | SYSTOLIC BLOOD PRESSURE: 157 MMHG

## 2022-03-12 VITALS
TEMPERATURE: 98 F | HEART RATE: 74 BPM | OXYGEN SATURATION: 96 % | SYSTOLIC BLOOD PRESSURE: 128 MMHG | RESPIRATION RATE: 17 BRPM | DIASTOLIC BLOOD PRESSURE: 80 MMHG

## 2022-03-12 DIAGNOSIS — Z98.890 OTHER SPECIFIED POSTPROCEDURAL STATES: Chronic | ICD-10-CM

## 2022-03-12 LAB
ALBUMIN SERPL ELPH-MCNC: 4.2 G/DL — SIGNIFICANT CHANGE UP (ref 3.3–5)
ALP SERPL-CCNC: 77 U/L — SIGNIFICANT CHANGE UP (ref 40–120)
ALT FLD-CCNC: 15 U/L — SIGNIFICANT CHANGE UP (ref 10–45)
ANION GAP SERPL CALC-SCNC: 14 MMOL/L — SIGNIFICANT CHANGE UP (ref 5–17)
APTT BLD: 29.2 SEC — SIGNIFICANT CHANGE UP (ref 27.5–35.5)
AST SERPL-CCNC: 24 U/L — SIGNIFICANT CHANGE UP (ref 10–40)
BASE EXCESS BLDV CALC-SCNC: 0.3 MMOL/L — SIGNIFICANT CHANGE UP (ref -2–3)
BASOPHILS # BLD AUTO: 0.03 K/UL — SIGNIFICANT CHANGE UP (ref 0–0.2)
BASOPHILS NFR BLD AUTO: 0.4 % — SIGNIFICANT CHANGE UP (ref 0–2)
BILIRUB SERPL-MCNC: 0.4 MG/DL — SIGNIFICANT CHANGE UP (ref 0.2–1.2)
BUN SERPL-MCNC: 16 MG/DL — SIGNIFICANT CHANGE UP (ref 7–23)
CA-I SERPL-SCNC: 1.21 MMOL/L — SIGNIFICANT CHANGE UP (ref 1.15–1.33)
CALCIUM SERPL-MCNC: 9.1 MG/DL — SIGNIFICANT CHANGE UP (ref 8.4–10.5)
CHLORIDE SERPL-SCNC: 105 MMOL/L — SIGNIFICANT CHANGE UP (ref 96–108)
CO2 BLDV-SCNC: 24.5 MMOL/L — SIGNIFICANT CHANGE UP (ref 22–26)
CO2 SERPL-SCNC: 19 MMOL/L — LOW (ref 22–31)
CREAT SERPL-MCNC: 0.62 MG/DL — SIGNIFICANT CHANGE UP (ref 0.5–1.3)
EGFR: 87 ML/MIN/1.73M2 — SIGNIFICANT CHANGE UP
EOSINOPHIL # BLD AUTO: 0.15 K/UL — SIGNIFICANT CHANGE UP (ref 0–0.5)
EOSINOPHIL NFR BLD AUTO: 2 % — SIGNIFICANT CHANGE UP (ref 0–6)
GAS PNL BLDV: 135 MMOL/L — LOW (ref 136–145)
GAS PNL BLDV: SIGNIFICANT CHANGE UP
GAS PNL BLDV: SIGNIFICANT CHANGE UP
GLUCOSE SERPL-MCNC: 96 MG/DL — SIGNIFICANT CHANGE UP (ref 70–99)
HCO3 BLDV-SCNC: 24 MMOL/L — SIGNIFICANT CHANGE UP (ref 22–29)
HCT VFR BLD CALC: 39.6 % — SIGNIFICANT CHANGE UP (ref 34.5–45)
HGB BLD-MCNC: 13.2 G/DL — SIGNIFICANT CHANGE UP (ref 11.5–15.5)
IMM GRANULOCYTES NFR BLD AUTO: 0.3 % — SIGNIFICANT CHANGE UP (ref 0–1.5)
INR BLD: 1.03 — SIGNIFICANT CHANGE UP (ref 0.88–1.16)
LYMPHOCYTES # BLD AUTO: 1.25 K/UL — SIGNIFICANT CHANGE UP (ref 1–3.3)
LYMPHOCYTES # BLD AUTO: 16.6 % — SIGNIFICANT CHANGE UP (ref 13–44)
MAGNESIUM SERPL-MCNC: 2.1 MG/DL — SIGNIFICANT CHANGE UP (ref 1.6–2.6)
MCHC RBC-ENTMCNC: 29.3 PG — SIGNIFICANT CHANGE UP (ref 27–34)
MCHC RBC-ENTMCNC: 33.3 GM/DL — SIGNIFICANT CHANGE UP (ref 32–36)
MCV RBC AUTO: 88 FL — SIGNIFICANT CHANGE UP (ref 80–100)
MONOCYTES # BLD AUTO: 0.68 K/UL — SIGNIFICANT CHANGE UP (ref 0–0.9)
MONOCYTES NFR BLD AUTO: 9.1 % — SIGNIFICANT CHANGE UP (ref 2–14)
NEUTROPHILS # BLD AUTO: 5.38 K/UL — SIGNIFICANT CHANGE UP (ref 1.8–7.4)
NEUTROPHILS NFR BLD AUTO: 71.6 % — SIGNIFICANT CHANGE UP (ref 43–77)
NRBC # BLD: 0 /100 WBCS — SIGNIFICANT CHANGE UP (ref 0–0)
NT-PROBNP SERPL-SCNC: 784 PG/ML — HIGH (ref 0–300)
PCO2 BLDV: 33 MMHG — LOW (ref 39–42)
PH BLDV: 7.46 — HIGH (ref 7.32–7.43)
PLATELET # BLD AUTO: 336 K/UL — SIGNIFICANT CHANGE UP (ref 150–400)
PO2 BLDV: 58 MMHG — HIGH (ref 25–45)
POTASSIUM BLDV-SCNC: 3.8 MMOL/L — SIGNIFICANT CHANGE UP (ref 3.5–5.1)
POTASSIUM SERPL-MCNC: 3.8 MMOL/L — SIGNIFICANT CHANGE UP (ref 3.5–5.3)
POTASSIUM SERPL-SCNC: 3.8 MMOL/L — SIGNIFICANT CHANGE UP (ref 3.5–5.3)
PROT SERPL-MCNC: 7.4 G/DL — SIGNIFICANT CHANGE UP (ref 6–8.3)
PROTHROM AB SERPL-ACNC: 12.3 SEC — SIGNIFICANT CHANGE UP (ref 10.5–13.4)
RBC # BLD: 4.5 M/UL — SIGNIFICANT CHANGE UP (ref 3.8–5.2)
RBC # FLD: 13.9 % — SIGNIFICANT CHANGE UP (ref 10.3–14.5)
SAO2 % BLDV: 91 % — HIGH (ref 67–88)
SARS-COV-2 RNA SPEC QL NAA+PROBE: NEGATIVE — SIGNIFICANT CHANGE UP
SODIUM SERPL-SCNC: 138 MMOL/L — SIGNIFICANT CHANGE UP (ref 135–145)
TROPONIN T SERPL-MCNC: 0.01 NG/ML — SIGNIFICANT CHANGE UP (ref 0–0.01)
WBC # BLD: 7.51 K/UL — SIGNIFICANT CHANGE UP (ref 3.8–10.5)
WBC # FLD AUTO: 7.51 K/UL — SIGNIFICANT CHANGE UP (ref 3.8–10.5)

## 2022-03-12 PROCEDURE — 85730 THROMBOPLASTIN TIME PARTIAL: CPT

## 2022-03-12 PROCEDURE — 87635 SARS-COV-2 COVID-19 AMP PRB: CPT

## 2022-03-12 PROCEDURE — 85025 COMPLETE CBC W/AUTO DIFF WBC: CPT

## 2022-03-12 PROCEDURE — 84295 ASSAY OF SERUM SODIUM: CPT

## 2022-03-12 PROCEDURE — 82330 ASSAY OF CALCIUM: CPT

## 2022-03-12 PROCEDURE — 93010 ELECTROCARDIOGRAM REPORT: CPT

## 2022-03-12 PROCEDURE — 99285 EMERGENCY DEPT VISIT HI MDM: CPT | Mod: 25

## 2022-03-12 PROCEDURE — 71045 X-RAY EXAM CHEST 1 VIEW: CPT

## 2022-03-12 PROCEDURE — 82803 BLOOD GASES ANY COMBINATION: CPT

## 2022-03-12 PROCEDURE — 85610 PROTHROMBIN TIME: CPT

## 2022-03-12 PROCEDURE — 84132 ASSAY OF SERUM POTASSIUM: CPT

## 2022-03-12 PROCEDURE — 71045 X-RAY EXAM CHEST 1 VIEW: CPT | Mod: 26

## 2022-03-12 PROCEDURE — 83880 ASSAY OF NATRIURETIC PEPTIDE: CPT

## 2022-03-12 PROCEDURE — 93005 ELECTROCARDIOGRAM TRACING: CPT

## 2022-03-12 PROCEDURE — 83735 ASSAY OF MAGNESIUM: CPT

## 2022-03-12 PROCEDURE — 84484 ASSAY OF TROPONIN QUANT: CPT

## 2022-03-12 PROCEDURE — 80053 COMPREHEN METABOLIC PANEL: CPT

## 2022-03-12 PROCEDURE — 36415 COLL VENOUS BLD VENIPUNCTURE: CPT

## 2022-03-12 RX ORDER — FUROSEMIDE 40 MG
20 TABLET ORAL ONCE
Refills: 0 | Status: COMPLETED | OUTPATIENT
Start: 2022-03-12 | End: 2022-03-12

## 2022-03-12 RX ADMIN — Medication 20 MILLIGRAM(S): at 05:40

## 2022-03-12 NOTE — ED ADULT TRIAGE NOTE - ARRIVAL INFO ADDITIONAL COMMENTS
Patient reported to have difficulty breathing while asleep. Patient and  report that the patient has been experiencing this "for a while." Patient's husbands attributes patient's presenting condition to be due to her Tobacco use. Patient presents able to speak. Denies fever. Reports occasional non-productive cough.

## 2022-03-12 NOTE — ED PROVIDER NOTE - PATIENT PORTAL LINK FT
You can access the FollowMyHealth Patient Portal offered by [facility] by registering at the following website: [email or web address]. By joining [employer]’s FollowMyHealth portal, you will also be able to view your health information using other applications (apps) compatible with our system.

## 2022-03-12 NOTE — ED PROVIDER NOTE - NSFOLLOWUPINSTRUCTIONS_ED_ALL_ED_FT
Follow-up with cardiology      Dyspnea    WHAT YOU NEED TO KNOW:    What is dyspnea? Dyspnea is breathing difficulty or discomfort. You may have labored, painful, or shallow breathing. You may feel breathless or short of breath. Dyspnea can occur during rest or with activity. You may have dyspnea for a short time, or it might become chronic. Dyspnea is often a symptom of a disease or condition.    What signs and symptoms can occur with dyspnea?   •Chest tightness or pain    •A cough, or a coarse or high-pitched noise when you breathe    •Pale and sweaty, cool skin    •Confusion and tiredness    •Bluish-gray lips or nails    What increases my risk for dyspnea?   •Swelling in the throat from an infection or allergic reaction    •Lung conditions such as asthma, COPD, cancer, infection, or a blood clot    •Heart conditions such as abnormal heartbeats, heart failure, or coronary artery disease    •Smoking, exposure to chemicals such as carbon monoxide, or too much aspirin    •A condition that affects your central nervous system, such as a spinal cord injury or nerve damage    •Enlarged abdomen because you are overweight, pregnant, or have ascites (fluid in the abdomen) from liver disease     •Anemia (low red blood cell count), anxiety, panic, or going to a high altitude     How is the cause of dyspnea diagnosed? Your healthcare provider may ask when your dyspnea began and what you were doing. Tell him or her how often you have dyspnea and what makes it worse or better. Tell your healthcare provider about medicines you take. Describe any other symptoms, such as pain or a fever. Your healthcare provider will listen to you breathe and watch for irregular breathing. You may also need the following:   •A pulse oximeter is a device that measures the amount of oxygen in your blood. A cord with a clip or sticky strip is placed on your finger, ear, or toe. The other end of the cord is hooked to a machine.     •Blood tests may show your healthcare provider if you are at risk for blood clots or heart failure. Blood tests can also show if you have anemia or an infection.     •X-ray pictures may show signs of infection or fluid around your heart or lungs.     •Exercise tests help your healthcare provider learn if you have symptoms, along with dyspnea, that limit activity. Symptoms include leg pain, fatigue, and weakness. Exercise tests can also show if your dyspnea is caused by heart problems.     •CT scan pictures may show blood clots or an area of disease in your lungs. You may be given contract liquid to help your lungs show up better in the pictures. Tell the healthcare provider if you have ever had an allergic reaction to contrast liquid.     •An echocardiogram is a type of ultrasound. Sound waves are used to show the structure and function of your heart.    •An EKG is a test that measures the electrical activity of your heart.    How is dyspnea treated? You may need treatment if your symptoms prevent you from doing your daily activities. The condition causing your dyspnea may need to be treated. You may also need the following to improve your symptoms:   •Oxygen therapy may be used to help you breathe easier. You may need oxygen if your blood oxygen level is lower than it should be.     •Medicines may be used to treat the cause of your dyspnea. Medicines may reduce swelling in your airway or decrease extra fluid from around your heart or lungs. Other medicines may be used to decrease anxiety and help you feel calm and relaxed.    •Pulmonary rehabilitation is used to reduce your symptoms while keeping you active. You may learn breathing techniques, muscle strengthening, and how to pace yourself when you are active.     How can I manage long-term dyspnea?   •Create an action plan. You and your healthcare provider can work together to create a plan for how to handle episodes of dyspnea. The plan can include daily activities, treatment changes, and what to do if you have severe breathing problems.    •Lean forward on your elbows when you sit. This helps your lungs expand and may make it easier to breathe.    •Use pursed-lip breathing any time you feel short of breath. Breathe in through your nose and then slowly breathe out through your mouth with your lips slightly puckered. It should take you twice as long to breathe out as it did to breathe in.    •Do not smoke. Nicotine and other chemicals in cigarettes and cigars can cause lung damage and make it harder to breathe. Ask your healthcare provider for information if you currently smoke and need help to quit. E-cigarettes or smokeless tobacco still contain nicotine. Talk to your healthcare provider before you use these products.     •Reach or maintain a healthy weight. Your healthcare provider can help you create a safe weight loss plan if you are overweight.    •Exercise as directed. Exercise can help your lungs work more easily. Exercise can also help you lose weight if needed. Try to get at least 30 minutes of exercise most days of the week. Your healthcare provider can help you create an exercise plan that is safe for you.    When should I seek immediate care?   •Your signs and symptoms are the same or worse within 24 hours of treatment.     •You have shaking chills or a fever over 102°F.     •You have new pain, pressure, or tightness in your chest.     •You have a new or worse cough or wheezing, or you cough up blood.    •You feel like you cannot get enough air.    •The skin over your ribs or on your neck sinks in when you breathe.     •You have a severe headache with vomiting and abdominal pain.     •You feel confused or dizzy.    When should I call my doctor or specialist?   •You have questions or concerns about your condition or care.    CARE AGREEMENT:    You have the right to help plan your care. Learn about your health condition and how it may be treated. Discuss treatment options with your healthcare providers to decide what care you want to receive. You always have the right to refuse treatment.       Disnea    LO QUE NECESITA SABER:    ¿Qué es la disnea?La disnea es sp dificultad o incomodidad al respirar. Es posible que tenga respiración fatigosa, dolorosa o poco profunda. Es posible que le falte el aire. La disnea puede ocurrir mientras está en reposo o al realizar sp actividad. Es posible que tenga disnea por un corto período de tiempo, o puede ser crónica. La disnea es a menudo un síntoma de sp enfermedad o afección.    ¿Cuáles son los signos y síntomas que pueden ocurrir con la disnea?  •Opresión o dolor en el pecho    •Tos, o un ruido áspero o mateus al respirar    •Piel pálida, sudorosa y fría al tacto    •Confusión y fatiga    •Labios o uñas de azuladas a mitchel    ¿Qué aumenta mi riesgo de tener disnea?  •La garganta inflamada por sp infección o sp reacción alérgica    •Afecciones del pulmón debido al asma, sp enfermedad pulmonar obstructiva crónica (EPOC), cáncer, infección, o un coágulo de kasandra    •Afecciones cardíacas, dion latidos cardíacos anormales, insuficiencia cardíaca o enfermedad de la arteria coronaría    •Tabaquismo, exposición a químicos dion monóxido de carbono o demasiada aspirina    •Sp condición que afecta stout sistema central nerviosos, dion sp lesión a la servando dorsal o un daño al nervio    •Abdomen ensanchado debido al sobrepeso, embarazo, o tiene ascitis (exceso de líquido en la cavidad del abdomen) de sp enfermedad del hígado    •Anemia (bajo recuento de los glóbulos rojos), ansiedad, pánico, o por ir a un lugar con sp gran altitud (altura elevada en las montañas por encima del nivel del mar)    ¿Cómo se diagnostica la causa de la disnea?Es posible que stout médico le pregunte cuándo comenzó stout disnea y qué estaba haciendo. Coméntele con qué frecuencia tiene disnea y qué situaciones la empeoran o mejoran. Informe a stout médico qué medicamentos marc. Descríbale otros síntomas, dion dolor o fiebre. Stout médico auscultará stout respiración y estará atento a cualquier respiración irregular. Es posible que también necesite lo siguiente:   •Un oxímetro de pulsoes un dispositivo que mide la cantidad de oxígeno en stout kasandra. Se coloca un cordón con sp pinza en stout dedo, oído, o dedo del pie. La otra extremidad del cordón se sujeta a la máquina.    •Los análisis de sangrepueden mostrar a stout médico si usted tiene riesgo de que se le formen coágulos de kasandra o de presentar insuficiencia cardíaca. Las pruebas de kasandra también pueden mostrar si usted tiene anemia o sp infección.    •Las radiografíaspueden mostrar signos de infección o líquido alrededor del corazón o los pulmones.    •Los exámenes físicosle permite a stout médico saber si usted tiene síntomas, además de la disnea, que limitan stout actividad. Los síntomas incluyen dolor en las piernas, fatiga y debilidad. El examen físico puede también mostrar si stout disnea fue causada por un problema del corazón.    •La tomografía computarizadaLas imágenes de tomografía computarizada pueden mostrar un coágulo de kasandra o un área de enfermedad en los pulmones. Es posible que le administren líquido de contraste para que elizabeth pulmones se aprecien mejor en las imágenes. Dígale al médico si usted alguna vez ha tenido sp reacción alérgica al líquido de contraste.    •Un ecocardiogramaes un tipo de ultrasonido. Se usan ondas sonoras para mostrar la estructura y función de stout corazón.    •Un electrocardiogramaes un examen que mide la actividad eléctrica de stout corazón.    ¿Cuál es el tratamiento para la disnea?Es posible que necesite tratamiento si los síntomas le impiden realizar elizabeth actividades cotidianas. La afección que le causa disnea debería tratarse. Es posible que necesite lo siguiente para mejorar elizabeth síntomas:   •La terapia con oxígenopodría usarse para ayudarlo a respirar. Podría necesitar oxígeno si el nivel de oxígeno en stout kasandra está por debajo del recomendado.    •Los medicamentospueden administrarse para tratar la causa de stout disnea. Los medicamentos pueden reducir la inflamación de las vías respiratorias o disminuir el líquido alrededor del corazón o los pulmones. Se pueden administrar otros medicamentos para reducir la ansiedad y para que se sienta más calmado y relajado.    •La rehabilitación pulmonarse emplea para reducir los síntomas, a la vez que lo mantiene activo. Aprenderá técnicas para respirar, a fortalecer los músculos y a regular el ritmo cuando esté activo.    ¿Cómo sobrellevar la disnea a wes tiempo?  •Elabore un plan de acción.Usted y stout médico pueden trabajar juntos para crear un plan sobre cómo manejar los episodios de disnea. El plan puede incluir las actividades diarias, cambios de tratamiento y qué hacer si usted tiene problemas respiratorios graves.    •Al jordyn asiento inclínese hacia adelante en elizabeth codos.[place] ayuda a expandir los pulmones y puede que le sea más fácil respirar.    •Use la respiración con los labios fruncidos cada vez que se sienta corto de respiración.Respire por la nariz y muy despacio exhale por stout boca con los labios ligeramente fruncidos o en forma de ''U''. Se debería demorar el doble de tiempo al expulsar el aire de lo que le richardson en inhalarlo.    •No fume.La nicotina y otros químicos contenidos en los cigarrillos y puros pueden causar daño pulmonar y empeorar elizabeth síntomas. Pida información a stout médico si usted actualmente fuma y necesita ayuda para dejar de fumar. Los cigarrillos electrónicos o el tabaco sin humo igualmente contienen nicotina. Consulte con stout médico antes de utilizar estos productos.    •Alcance o mantenga un peso saludable.Stout médico le puede ayudar a elaborar un plan para perder peso de sp forma purvis si usted tiene sobrepeso.    •Ejercítese según indicaciones.El ejercicio puede ayudar a los pulmones a trabajar más fácilmente. El ejercicio también puede ayudar a perder peso, si es necesario. Trate de hacer unos 30 minutos de ejercicio la mayoría de los días de la semana. Stout médico puede ayudarlo a crear un plan de ejercicios que sea seguro para usted.    ¿Cuándo rojelio buscar atención inmediata?  •Elizabeth signos y síntomas están igual o empeoran en las siguientes 24 horas del tratamiento.    •Usted tiene escalofríos con temblores o fiebre de más de 102° F (38.9° C).    •Usted tiene un nuevo dolor, presión u opresión en stout pecho.    •Usted tiene sp nueva o empeora stout tos o sibilancias (respiración ruidosa en el pecho) o expectora kasandra    •Usted siente que no recibe suficiente aire.    •La piel sobre elizabeth costillas o en stout dionisio se hunden cuando usted respira.    •Usted tiene un aliyah dolor de juan francisco con vómitos y dolor abdominal.    •Usted se siente confundido o mareado    ¿Cuándo rojelio llamar a mi médico o especialista?  •Usted tiene preguntas o inquietudes acerca de stout condición o cuidado.    ACUERDOS SOBRE STOUT CUIDADO:    Usted tiene el derecho de ayudar a planear stout cuidado. Aprenda todo lo que pueda sobre stout condición y dion darle tratamiento. Discuta elizabeth opciones de tratamiento con elizabeth médicos para decidir el cuidado que usted desea recibir. Usted siempre tiene el derecho de rechazar el tratamiento.

## 2022-03-12 NOTE — ED ADULT NURSE REASSESSMENT NOTE - NS ED NURSE REASSESS COMMENT FT1
pt. comfortable, hendrix's, interventions remain in-tact, with CCM, family member at bedside, assessment on-going, utd on poc, with understanding verbalized

## 2022-03-12 NOTE — ED PROVIDER NOTE - OBJECTIVE STATEMENT
86F hx htn, high chol, c/o SOB.  pt states occurs at night, worsening over past 3 months. worse when she tries to lay down or with exertion.   states he has been propping her up with pillows. no chest pain. no cough. no n/v. no abd pain.  no fevers. no recent travel. no sick contacts. no LE swelling. pt is active smoker.

## 2022-03-12 NOTE — ED PROVIDER NOTE - PROVIDER TOKENS
PROVIDER:[TOKEN:[[id number]]],PROVIDER:[TOKEN:[[id number]:[TOKEN:[[id number]]],PROVIDER:[TOKEN:[[id number]]]

## 2022-03-12 NOTE — ED PROVIDER NOTE - PROGRESS NOTE DETAILS
pt comfortable, no resp distress. no tachypnea, no hypoxia, speaking in full sentences. elevated in pro-bnp, however no pulm vasc congestion or pleural effusion on CXR. will give dose of lasix now, pt would like to go home. advise close cardiology f/u as pt will likely benefit from echo.  also advised smoking cessation.  given close return precautions.    I have discussed the discharge plan with the patient. The patient agrees with the plan, as discussed.  The patient understands Emergency Department diagnosis is a preliminary diagnosis often based on limited information and that the patient must adhere to the follow-up plan as discussed.  The patient understands that if the symptoms worsen the patient may return to the Emergency Department at any time for further evaluation and treatment.

## 2022-03-12 NOTE — ED ADULT NURSE NOTE - OBJECTIVE STATEMENT
86F hx htn, high chol, c/o SOB.  pt states occurs at night, worsening over past 3 months. worse when she tries to lay down or with exertion.   states he has been propping her up with pillows. no chest pain. no cough. no n/v. no abd pain.  no fevers. no recent travel. no sick contacts. no LE swelling. pt is active smoker

## 2022-03-12 NOTE — ED PROVIDER NOTE - CARE PROVIDER_API CALL
Neva Agee)  Cardiovascular Disease; Internal Medicine  [address], [address]  [place] [address]  Phone: [phone]  Fax: [phone]  Follow Up Time:     Phil Reece)  Internal Medicine  [address], [address] [place] [address]  Phone: [phone]  Fax: [phone]  Follow Up Time:     Mary Ann Perez)  Internal Medicine  [address], [address] [place] [address]  Phone: [phone]  Fax: [phone]  Follow Up Time:     Avani Hood)  Cardiology; Internal Medicine  [address]  [place] [address]  Phone: [phone]  Fax: [phone]  Follow Up Time:

## 2022-03-12 NOTE — ED ADULT NURSE REASSESSMENT NOTE - NS ED NURSE REASSESS COMMENT FT1
medicated as noted, rhett. well, awaiting final dispo. papers, pt. aware, with understanding verbalized

## 2022-03-12 NOTE — ED PROVIDER NOTE - CLINICAL SUMMARY MEDICAL DECISION MAKING FREE TEXT BOX
SOB, worse with exertion and laying flat, afebrile, speaking in full sentences, lungs clear on exam  -check labs  -ekg  -cxr

## 2022-03-14 DIAGNOSIS — Z88.8 ALLERGY STATUS TO OTHER DRUGS, MEDICAMENTS AND BIOLOGICAL SUBSTANCES STATUS: ICD-10-CM

## 2022-03-14 DIAGNOSIS — Z88.0 ALLERGY STATUS TO PENICILLIN: ICD-10-CM

## 2022-03-14 DIAGNOSIS — I10 ESSENTIAL (PRIMARY) HYPERTENSION: ICD-10-CM

## 2022-03-14 DIAGNOSIS — R06.00 DYSPNEA, UNSPECIFIED: ICD-10-CM

## 2022-03-14 DIAGNOSIS — F17.200 NICOTINE DEPENDENCE, UNSPECIFIED, UNCOMPLICATED: ICD-10-CM

## 2022-03-14 DIAGNOSIS — R06.02 SHORTNESS OF BREATH: ICD-10-CM

## 2022-03-14 DIAGNOSIS — Z20.822 CONTACT WITH AND (SUSPECTED) EXPOSURE TO COVID-19: ICD-10-CM

## 2022-04-16 NOTE — ED ADULT TRIAGE NOTE - NSWEIGHTCALCTOOLDRUG_GEN_A_CORE
Progress Note    Patient: Emelia Barker MRN: [id number]  SSN: [id number]    YOB: 1947  Age: 76 y.o. Sex: female      Admit Date: 4/3/2022    LOS: 12 days     Subjective:   Patient followed for sepsis with UTI secondary to Pseudomonas sensitive to Zosyn, nearing completion but now with Candida UTI on Fluconazole. WBC, procal and CRP decreasing. Patient  Remains nonverbal but appeared to be resting comfortably. Daughter at bedside. Objective:     Vitals:    04/15/22 2100 04/16/22 0034 04/16/22 0558 04/16/22 0759   BP: (!) 156/70 138/67 (!) 161/74 (!) 155/65   Pulse: 76 67 76 75   Resp: 21 18 19   Temp: 100.1 °F (37.8 °C) 99.2 °F (37.3 °C)  98.5 °F (36.9 °C)   SpO2: 99% 98%  100%   Weight:       Height:            Intake and Output:  Current Shift: No intake/output data recorded. Last three shifts: 04/14 1901 - 04/16 0700  In: 2500   Out: 3975 [Urine:3775]    Physical Exam:   Vitals and nursing note reviewed. Constitutional:       General: She is not in acute distress. Appearance: She is ill-appearing. HENT: eyes closed, Room Air   Cardiovascular:      Rate and Rhythm: Normal rate and regular rhythm. Heart sounds: No murmur heard. Pulmonary:      Effort: Pulmonary effort is normal.      Breath sounds: Normal breath sounds. Abdominal:      General: Bowel sounds are normal.      Palpations: Abdomen is soft. Tenderness: There is no abdominal tenderness. Comments: PEG tube in place, site unremarkable, tube feeding in progress  Genitourinary:     Comments: Bell catheter with moderate urine sediment  Flexiseal with dark brown watery stool  Musculoskeletal:      Right lower leg: No edema. Left lower leg: No edema. Comments: Left great toe with dry gangrene, dark and shrunken   Skin: Stage 2 sacral wound     Findings: No rash. Neurological:      Mental Status: She is alert.       Comments: Unable to assess   Psychiatric:      Comments: Unable to assess Lab/Data Review:     WBC 13,700  UA with WBC >100, budding yeasts    Procal 1.55 <2.01 <2.42 < 2.28 <1.93 <1.64 <3.37 <3.78 <4.78 <6.93  CRP 5.89 <6.55 < 7.65 <3.14 <2.33 <3.44 <11.50 < 14.50 <16.10 <21.40 <28.40    Blood cultures (4/3) No growth FINAL  Blood cultures (4/10) 1 of  4 bottles flagged positive, No organisms seen  Urine culture (4/7) >100,000 cfu/ml Pseudomonas aeruginosa   Urine culture (4/14) >100,000 cfu/ml mixed urogenital yasmine    CXR (4/11) No acute pulmonary process    Assessment:     Active Problems:    Hyperkalemia (4/4/2022)      UTI (urinary tract infection) (4/4/2022)      Sepsis ([facility] [address] (4/4/2022)      RICARDO (acute kidney injury) ([facility] [address] (4/4/2022)      AMS (altered mental status) (4/4/2022)    1. Sepsis with fever and leukocytosis, elevated procal and CRP, resolving, on empiric IV Vancomycin  2. UTI with marked pyuria and bacteriuria, secondary to Pseudomonas aeruginosa, Day #13 IV Zosyn  3. Altered mentals status, multifactorial including sepsis  4. Uncontrolled diabetes mellitus with hyperglycemia  5. Chronic maxillary sinusitis  6. ASCVD with prior stroke  7. Hepatitis C antibody positive, HCV RNA negative  8. PAD with dry gangrene left great toe, pending possible amputation  9. Sacral wound, Stage 2, no evidence of gross infection  10. Acute hypoxic respiratory failure, resolved  11. Diarrhea, presumed secondary to tube feeding  12. Candida UTI with marked pyuria and yeasts, Day #2 IV Fluconazole    Comment:  WBC, procal and CRP all decreasing after starting Fluconazole. Plan:   1. Continue IV Zosyn for 1 more day  2. Continue Fluconazole   3. Follow-up blood cultures  4.  In am, repeat CBC, procal and CRP        Signed By: Kadeem Powell MD     April 16, 2022  used

## 2022-05-27 ENCOUNTER — EMERGENCY (EMERGENCY)
Facility: HOSPITAL | Age: 87
LOS: 1 days | Discharge: HOME | End: 2022-05-27
Admitting: STUDENT IN AN ORGANIZED HEALTH CARE EDUCATION/TRAINING PROGRAM
Payer: MEDICARE

## 2022-05-27 VITALS
TEMPERATURE: 98 F | HEART RATE: 71 BPM | DIASTOLIC BLOOD PRESSURE: 83 MMHG | WEIGHT: 85.1 LBS | SYSTOLIC BLOOD PRESSURE: 133 MMHG | RESPIRATION RATE: 18 BRPM | OXYGEN SATURATION: 99 % | HEIGHT: 59 IN

## 2022-05-27 DIAGNOSIS — Z98.890 OTHER SPECIFIED POSTPROCEDURAL STATES: Chronic | ICD-10-CM

## 2022-05-27 PROCEDURE — 99282 EMERGENCY DEPT VISIT SF MDM: CPT

## 2022-05-27 NOTE — ED PROVIDER NOTE - NSFOLLOWUPINSTRUCTIONS_ED_ALL_ED_FT
Itchy Skin  WHAT YOU NEED TO KNOW:  Itchy skin may interfere with your daily tasks and sleep. Treatment is important because constant scratching can damage your skin and increase your risk of infection.  DISCHARGE INSTRUCTIONS:  Medicines:  •Medicines may help decrease itching or inflammation. Skin creams, such as steroid creams or anti-itch creams may also help.  •Take your medicine as directed. Contact your healthcare provider if you think your medicine is not helping or if you have side effects. Tell him or her if you are allergic to any medicine. Keep a list of the medicines, vitamins, and herbs you take. Include the amounts, and when and why you take them. Bring the list or the pill bottles to follow-up visits. Carry your medicine list with you in case of an emergency.  Follow up with your healthcare provider as directed: Write down your questions so you remember to ask them during your visits.   Manage itchy skin:   •Take short showers in warm water. Avoid using hot water for your showers. Use only a small amount of mild skin cleanser.  •Apply moisturizer or cooling creams after you bathe and throughout the day.   •Use a cool mist humidifier to moisten the air in your home and maintain a cool temperature. Cool, humid air can decrease skin dryness and itching.   •Avoid allergens and skin irritants. Do not use perfume, fabric softener, or makeup that irritates your skin. Use a mild detergent to wash your clothes. Wear loose cotton clothes and use cotton sheets. Avoid wool.   Contact your healthcare provider if:   •Your itching does not improve or gets worse.   •Scratching has caused your skin to be red or swollen.  •You have new symptoms such as weight loss, fatigue, changes in urination, or fever.   •You have questions or concerns about your condition or care. Itchy Skin  take zyrtec or allegra or claritin as needed and follow up with a dermatologist or pmd  WHAT YOU NEED TO KNOW:  Itchy skin may interfere with your daily tasks and sleep. Treatment is important because constant scratching can damage your skin and increase your risk of infection.  DISCHARGE INSTRUCTIONS:  Medicines:  •Medicines may help decrease itching or inflammation. Skin creams, such as steroid creams or anti-itch creams may also help.  •Take your medicine as directed. Contact your healthcare provider if you think your medicine is not helping or if you have side effects. Tell him or her if you are allergic to any medicine. Keep a list of the medicines, vitamins, and herbs you take. Include the amounts, and when and why you take them. Bring the list or the pill bottles to follow-up visits. Carry your medicine list with you in case of an emergency.  Follow up with your healthcare provider as directed: Write down your questions so you remember to ask them during your visits.   Manage itchy skin:   •Take short showers in warm water. Avoid using hot water for your showers. Use only a small amount of mild skin cleanser.  •Apply moisturizer or cooling creams after you bathe and throughout the day.   •Use a cool mist humidifier to moisten the air in your home and maintain a cool temperature. Cool, humid air can decrease skin dryness and itching.   •Avoid allergens and skin irritants. Do not use perfume, fabric softener, or makeup that irritates your skin. Use a mild detergent to wash your clothes. Wear loose cotton clothes and use cotton sheets. Avoid wool.   Contact your healthcare provider if:   •Your itching does not improve or gets worse.   •Scratching has caused your skin to be red or swollen.  •You have new symptoms such as weight loss, fatigue, changes in urination, or fever.   •You have questions or concerns about your condition or care.

## 2022-05-27 NOTE — ED PROVIDER NOTE - OBJECTIVE STATEMENT
The pt is a 85 y/o F, who presents to ED w/son, c/o itching x few wks. States that itching is all over, has been rubbing milk of magnesia on her skin to help w/symptoms. Denies new meds or foods, new soap or detergent, fevers or chills, throat closing, sob, n/v. Has not seen pmd or derm for this.

## 2022-05-27 NOTE — ED PROVIDER NOTE - ENMT, MLM
Airway patent, Nasal mucosa clear. Mouth with normal mucosa. Throat has no vesicles, no oropharyngeal exudates and uvula is midline. no lesions to mucosa

## 2022-05-27 NOTE — ED PROVIDER NOTE - CLINICAL SUMMARY MEDICAL DECISION MAKING FREE TEXT BOX
pt c/o generalized body itching x while, no new foods/meds, no exam findings, advised to take OTC meds (zyrtec or allegra) and f/u w/pmd or derm, pt understands and agrees w/plan

## 2022-05-27 NOTE — ED PROVIDER NOTE - PATIENT PORTAL LINK FT
You can access the FollowMyHealth Patient Portal offered by [facility] by registering at the following website: [email or web address]. By joining [facility]’s FollowMyHealth portal, you will also be able to view your health information using other applications (apps) compatible with our system.

## 2022-05-31 DIAGNOSIS — Z88.0 ALLERGY STATUS TO PENICILLIN: ICD-10-CM

## 2022-05-31 DIAGNOSIS — L29.9 PRURITUS, UNSPECIFIED: ICD-10-CM

## 2022-05-31 DIAGNOSIS — Z88.8 ALLERGY STATUS TO OTHER DRUGS, MEDICAMENTS AND BIOLOGICAL SUBSTANCES: ICD-10-CM

## 2022-05-31 DIAGNOSIS — Z88.5 ALLERGY STATUS TO NARCOTIC AGENT: ICD-10-CM

## 2022-06-13 ENCOUNTER — APPOINTMENT (OUTPATIENT)
Dept: NEUROLOGY | Facility: CLINIC | Age: 87
End: 2022-06-13
Payer: MEDICARE

## 2022-06-13 VITALS
DIASTOLIC BLOOD PRESSURE: 73 MMHG | BODY MASS INDEX: 17.08 KG/M2 | WEIGHT: 87 LBS | HEIGHT: 60 IN | HEART RATE: 63 BPM | TEMPERATURE: 97.9 F | OXYGEN SATURATION: 97 % | SYSTOLIC BLOOD PRESSURE: 136 MMHG

## 2022-06-13 DIAGNOSIS — F01.50 VASCULAR DEMENTIA W/OUT BEHAVIORAL DISTURBANCE: ICD-10-CM

## 2022-06-13 DIAGNOSIS — L29.9 PRURITUS, UNSPECIFIED: ICD-10-CM

## 2022-06-13 PROCEDURE — 99214 OFFICE O/P EST MOD 30 MIN: CPT

## 2022-06-15 ENCOUNTER — EMERGENCY (EMERGENCY)
Facility: HOSPITAL | Age: 87
LOS: 1 days | Discharge: HOME | End: 2022-06-15
Attending: EMERGENCY MEDICINE | Admitting: EMERGENCY MEDICINE
Payer: MEDICARE

## 2022-06-15 VITALS
RESPIRATION RATE: 18 BRPM | HEART RATE: 60 BPM | DIASTOLIC BLOOD PRESSURE: 74 MMHG | HEIGHT: 59 IN | OXYGEN SATURATION: 95 % | WEIGHT: 89.07 LBS | TEMPERATURE: 98 F | SYSTOLIC BLOOD PRESSURE: 124 MMHG

## 2022-06-15 VITALS
DIASTOLIC BLOOD PRESSURE: 70 MMHG | HEART RATE: 68 BPM | TEMPERATURE: 98 F | OXYGEN SATURATION: 94 % | RESPIRATION RATE: 18 BRPM | SYSTOLIC BLOOD PRESSURE: 148 MMHG

## 2022-06-15 DIAGNOSIS — Z98.890 OTHER SPECIFIED POSTPROCEDURAL STATES: Chronic | ICD-10-CM

## 2022-06-15 DIAGNOSIS — Z88.8 ALLERGY STATUS TO OTHER DRUGS, MEDICAMENTS AND BIOLOGICAL SUBSTANCES: ICD-10-CM

## 2022-06-15 DIAGNOSIS — E78.5 HYPERLIPIDEMIA, UNSPECIFIED: ICD-10-CM

## 2022-06-15 DIAGNOSIS — Z88.0 ALLERGY STATUS TO PENICILLIN: ICD-10-CM

## 2022-06-15 DIAGNOSIS — R06.02 SHORTNESS OF BREATH: ICD-10-CM

## 2022-06-15 DIAGNOSIS — I10 ESSENTIAL (PRIMARY) HYPERTENSION: ICD-10-CM

## 2022-06-15 LAB
ALBUMIN SERPL ELPH-MCNC: 4.3 G/DL — SIGNIFICANT CHANGE UP (ref 3.3–5)
ALP SERPL-CCNC: 76 U/L — SIGNIFICANT CHANGE UP (ref 40–120)
ALT FLD-CCNC: 12 U/L — SIGNIFICANT CHANGE UP (ref 10–45)
ANION GAP SERPL CALC-SCNC: 11 MMOL/L — SIGNIFICANT CHANGE UP (ref 5–17)
APTT BLD: 26.5 SEC — LOW (ref 27.5–35.5)
AST SERPL-CCNC: 22 U/L — SIGNIFICANT CHANGE UP (ref 10–40)
BASE EXCESS BLDV CALC-SCNC: 0.7 MMOL/L — SIGNIFICANT CHANGE UP (ref -2–3)
BASOPHILS # BLD AUTO: 0.03 K/UL — SIGNIFICANT CHANGE UP (ref 0–0.2)
BASOPHILS NFR BLD AUTO: 0.5 % — SIGNIFICANT CHANGE UP (ref 0–2)
BILIRUB SERPL-MCNC: 0.7 MG/DL — SIGNIFICANT CHANGE UP (ref 0.2–1.2)
BUN SERPL-MCNC: 22 MG/DL — SIGNIFICANT CHANGE UP (ref 7–23)
CA-I SERPL-SCNC: 1.29 MMOL/L — SIGNIFICANT CHANGE UP (ref 1.15–1.33)
CALCIUM SERPL-MCNC: 9.9 MG/DL — SIGNIFICANT CHANGE UP (ref 8.4–10.5)
CHLORIDE SERPL-SCNC: 105 MMOL/L — SIGNIFICANT CHANGE UP (ref 96–108)
CO2 BLDV-SCNC: 25.4 MMOL/L — SIGNIFICANT CHANGE UP (ref 22–26)
CO2 SERPL-SCNC: 23 MMOL/L — SIGNIFICANT CHANGE UP (ref 22–31)
CREAT SERPL-MCNC: 0.74 MG/DL — SIGNIFICANT CHANGE UP (ref 0.5–1.3)
EGFR: 78 ML/MIN/1.73M2 — SIGNIFICANT CHANGE UP
EOSINOPHIL # BLD AUTO: 0.1 K/UL — SIGNIFICANT CHANGE UP (ref 0–0.5)
EOSINOPHIL NFR BLD AUTO: 1.5 % — SIGNIFICANT CHANGE UP (ref 0–6)
GAS PNL BLDV: 136 MMOL/L — SIGNIFICANT CHANGE UP (ref 136–145)
GAS PNL BLDV: SIGNIFICANT CHANGE UP
GLUCOSE SERPL-MCNC: 85 MG/DL — SIGNIFICANT CHANGE UP (ref 70–99)
HCO3 BLDV-SCNC: 24 MMOL/L — SIGNIFICANT CHANGE UP (ref 22–29)
HCT VFR BLD CALC: 43.3 % — SIGNIFICANT CHANGE UP (ref 34.5–45)
HGB BLD-MCNC: 14.1 G/DL — SIGNIFICANT CHANGE UP (ref 11.5–15.5)
IMM GRANULOCYTES NFR BLD AUTO: 0.2 % — SIGNIFICANT CHANGE UP (ref 0–1.5)
INR BLD: 1.07 — SIGNIFICANT CHANGE UP (ref 0.88–1.16)
LYMPHOCYTES # BLD AUTO: 1.34 K/UL — SIGNIFICANT CHANGE UP (ref 1–3.3)
LYMPHOCYTES # BLD AUTO: 20.5 % — SIGNIFICANT CHANGE UP (ref 13–44)
MCHC RBC-ENTMCNC: 28.4 PG — SIGNIFICANT CHANGE UP (ref 27–34)
MCHC RBC-ENTMCNC: 32.6 GM/DL — SIGNIFICANT CHANGE UP (ref 32–36)
MCV RBC AUTO: 87.1 FL — SIGNIFICANT CHANGE UP (ref 80–100)
MONOCYTES # BLD AUTO: 0.75 K/UL — SIGNIFICANT CHANGE UP (ref 0–0.9)
MONOCYTES NFR BLD AUTO: 11.5 % — SIGNIFICANT CHANGE UP (ref 2–14)
NEUTROPHILS # BLD AUTO: 4.31 K/UL — SIGNIFICANT CHANGE UP (ref 1.8–7.4)
NEUTROPHILS NFR BLD AUTO: 65.8 % — SIGNIFICANT CHANGE UP (ref 43–77)
NRBC # BLD: 0 /100 WBCS — SIGNIFICANT CHANGE UP (ref 0–0)
NT-PROBNP SERPL-SCNC: 1022 PG/ML — HIGH (ref 0–300)
PCO2 BLDV: 35 MMHG — LOW (ref 39–42)
PH BLDV: 7.45 — HIGH (ref 7.32–7.43)
PLATELET # BLD AUTO: 303 K/UL — SIGNIFICANT CHANGE UP (ref 150–400)
PO2 BLDV: 44 MMHG — SIGNIFICANT CHANGE UP (ref 25–45)
POTASSIUM BLDV-SCNC: 4.7 MMOL/L — SIGNIFICANT CHANGE UP (ref 3.5–5.1)
POTASSIUM SERPL-MCNC: 4.7 MMOL/L — SIGNIFICANT CHANGE UP (ref 3.5–5.3)
POTASSIUM SERPL-SCNC: 4.7 MMOL/L — SIGNIFICANT CHANGE UP (ref 3.5–5.3)
PROT SERPL-MCNC: 7.8 G/DL — SIGNIFICANT CHANGE UP (ref 6–8.3)
PROTHROM AB SERPL-ACNC: 12.7 SEC — SIGNIFICANT CHANGE UP (ref 10.5–13.4)
RBC # BLD: 4.97 M/UL — SIGNIFICANT CHANGE UP (ref 3.8–5.2)
RBC # FLD: 14.3 % — SIGNIFICANT CHANGE UP (ref 10.3–14.5)
SAO2 % BLDV: 76.4 % — SIGNIFICANT CHANGE UP (ref 67–88)
SODIUM SERPL-SCNC: 139 MMOL/L — SIGNIFICANT CHANGE UP (ref 135–145)
TROPONIN T SERPL-MCNC: 0.01 NG/ML — SIGNIFICANT CHANGE UP (ref 0–0.01)
WBC # BLD: 6.54 K/UL — SIGNIFICANT CHANGE UP (ref 3.8–10.5)
WBC # FLD AUTO: 6.54 K/UL — SIGNIFICANT CHANGE UP (ref 3.8–10.5)

## 2022-06-15 PROCEDURE — 83880 ASSAY OF NATRIURETIC PEPTIDE: CPT

## 2022-06-15 PROCEDURE — 71045 X-RAY EXAM CHEST 1 VIEW: CPT | Mod: 26

## 2022-06-15 PROCEDURE — 84295 ASSAY OF SERUM SODIUM: CPT

## 2022-06-15 PROCEDURE — 93010 ELECTROCARDIOGRAM REPORT: CPT

## 2022-06-15 PROCEDURE — 84132 ASSAY OF SERUM POTASSIUM: CPT

## 2022-06-15 PROCEDURE — 71045 X-RAY EXAM CHEST 1 VIEW: CPT

## 2022-06-15 PROCEDURE — 82330 ASSAY OF CALCIUM: CPT

## 2022-06-15 PROCEDURE — 85025 COMPLETE CBC W/AUTO DIFF WBC: CPT

## 2022-06-15 PROCEDURE — 71045 X-RAY EXAM CHEST 1 VIEW: CPT | Mod: 26,77

## 2022-06-15 PROCEDURE — 85730 THROMBOPLASTIN TIME PARTIAL: CPT

## 2022-06-15 PROCEDURE — 84484 ASSAY OF TROPONIN QUANT: CPT

## 2022-06-15 PROCEDURE — 85610 PROTHROMBIN TIME: CPT

## 2022-06-15 PROCEDURE — 99285 EMERGENCY DEPT VISIT HI MDM: CPT | Mod: 25

## 2022-06-15 PROCEDURE — 93005 ELECTROCARDIOGRAM TRACING: CPT

## 2022-06-15 PROCEDURE — 36415 COLL VENOUS BLD VENIPUNCTURE: CPT

## 2022-06-15 PROCEDURE — 80053 COMPREHEN METABOLIC PANEL: CPT

## 2022-06-15 PROCEDURE — 82803 BLOOD GASES ANY COMBINATION: CPT

## 2022-06-15 NOTE — ED PROVIDER NOTE - NSFOLLOWUPINSTRUCTIONS_ED_ALL_ED_FT
Follow up with Dr. Wei!    Follow up with cardiologist at your scheduled appointment on 6/21/22.     Return for fevers, persistent vomit, worsening pain, worsening breathing, worsening lightheaded.    Shortness of breath    Shortness of breath (dyspnea) means you have trouble breathing and could indicate a medical problem. Causes include lung disease, heart disease, low amount of red blood cells (anemia), poor physical fitness, being overweight, smoking, etc. Your health care provider today may not be able to find a cause for your shortness of breath after your exam. In this case, it is important to have a follow-up exam with your primary care physician as instructed. If medicines were prescribed, take them as directed for the full length of time directed. Refrain from tobacco products.    SEEK IMMEDIATE MEDICAL CARE IF YOU HAVE ANY OF THE FOLLOWING SYMPTOMS: worsening shortness of breath, chest pain, back pain, abdominal pain, fever, coughing up blood, lightheadedness/dizziness. Follow up with Dr. Wei!    Follow up with cardiologist at your scheduled appointment on 6/21/22.     Return for fevers, persistent vomit, worsening pain, worsening breathing, worsening lightheaded.    Take your new medications that your cardiologist prescribed.     Shortness of breath    Shortness of breath (dyspnea) means you have trouble breathing and could indicate a medical problem. Causes include lung disease, heart disease, low amount of red blood cells (anemia), poor physical fitness, being overweight, smoking, etc. Your health care provider today may not be able to find a cause for your shortness of breath after your exam. In this case, it is important to have a follow-up exam with your primary care physician as instructed. If medicines were prescribed, take them as directed for the full length of time directed. Refrain from tobacco products.    SEEK IMMEDIATE MEDICAL CARE IF YOU HAVE ANY OF THE FOLLOWING SYMPTOMS: worsening shortness of breath, chest pain, back pain, abdominal pain, fever, coughing up blood, lightheadedness/dizziness.

## 2022-06-15 NOTE — ED PROVIDER NOTE - PATIENT PORTAL LINK FT
You can access the FollowMyHealth Patient Portal offered by [facility] by registering at the following website: [email or web address]. By joining [facility]’s FollowMyHealth portal, you will also be able to view your health information using other applications (apps) compatible with our system.

## 2022-06-15 NOTE — ED PROVIDER NOTE - OBJECTIVE STATEMENT
In ED w/ son.   87F PMH HTN, HLD p/w SOB. Pt c/o intermittent SOB for >6 months. Symptoms overall worse at night and with exertion.   Son states that they saw cardiologist Dr. Wei [phone] this past week. State that they were told her heart was functioning at 30%. State they were prescribed unknown meds and unsure of next steps. Son states they came to ED today bec of slowly worsening symptoms. No sudden changes. Pt currently asymptomatic. No other systemic symptoms.   Denies associated CP, nausea, vomiting, diarrhea, lightheaded, diaphoresis, palpitations, cough, rhinorrhea, black stool, bloody stool, LE pain, LE swelling, focal weakness/numbness, recent travel/immobilization, abd pain, urinary complaints, f/c. Unknown prior cardiac w/u.   meds: temazapam, lipitor, pepcid, megace, norvasc, alendronate plus other unknown meds started by cards

## 2022-06-15 NOTE — ED PROVIDER NOTE - CLINICAL SUMMARY MEDICAL DECISION MAKING FREE TEXT BOX
In ED w/ son.   87F PMH HTN, HLD p/w SOB. Pt c/o intermittent SOB for >6 months. Symptoms overall worse at night and with exertion.   Son states that they saw cardiologist Dr. Wei [phone] this past week. State that they were told her heart was functioning at 30%. State they were prescribed unknown meds and unsure of next steps. Son states they came to ED today bec of slowly worsening symptoms. No sudden changes. Pt currently asymptomatic. No other systemic symptoms.   Vitals wnl, exam as above.  ddx: Low suspicion ACS. Likely chronic heart failure, clinically not fluid overloaded.   Labs, XR, attempt to contact PMD, reassess.

## 2022-06-15 NOTE — ED ADULT TRIAGE NOTE - CHIEF COMPLAINT QUOTE
Pt presents co SOB worsening over the past few months. Per pt's son, "we went to the doctor and they said her heart is at 30% but we don't know what else." Denies associated sx. Denies CP, fevers, chills, leg swelling. EKG completed.

## 2022-06-15 NOTE — ED PROVIDER NOTE - PROGRESS NOTE DETAILS
Aniket[place]: d/w Dr. Wei (PMD). Pt also has hx vascular dementia. Saw cards on 6/10, Echo 30-35%, pt refused further ischemic w/u, was Supposed to start losartan 25 qd, metoprolol XL 25 qd, and spironolactone 25 qd. Agrees w/ outpt f/u if no significant findings on ED w/u. Pt has f/u w/ cards on 6/21. [place]pfish: Mild BNP increase to 1022, other labs grossly wnl. CXR wnl. Remains asymptomatic. Discussed importance of outpt follow up and return precautions. Clinically no indication for further emergent ED workup or hospitalization at this time. Comfortable for dc, outpt f/u.

## 2022-06-15 NOTE — ED ADULT NURSE NOTE - NSIMPLEMENTINTERV_GEN_ALL_ED
Implemented All Universal Safety Interventions:  [place] to call system. Call bell, personal items and telephone within reach. Instruct patient to call for assistance. Room bathroom lighting operational. Non-slip footwear when patient is off stretcher. Physically safe environment: no spills, clutter or unnecessary equipment. Stretcher in lowest position, wheels locked, appropriate side rails in place.

## 2022-06-16 PROBLEM — L29.9 CHRONIC PRURITUS: Status: ACTIVE | Noted: 2021-10-18

## 2022-06-29 ENCOUNTER — NON-APPOINTMENT (OUTPATIENT)
Age: 87
End: 2022-06-29

## 2022-09-16 ENCOUNTER — TRANSCRIPTION ENCOUNTER (OUTPATIENT)
Age: 87
End: 2022-09-16

## 2022-09-20 ENCOUNTER — TRANSCRIPTION ENCOUNTER (OUTPATIENT)
Age: 87
End: 2022-09-20

## 2022-10-06 NOTE — ED ADULT TRIAGE NOTE - CCCP TRG CHIEF CMPLNT
Submitted PA via Covermymeds. PA was approved. Informed patient and he expressed understanding. itching

## 2022-11-23 NOTE — ED PROVIDER NOTE - NS_EDPROVIDERDISPOUSERTYPE_ED_A_ED
----- Message from Roderick Andrew MD sent at 11/23/2022 12:05 PM CST -----  Negative pap and hpv repeat in 5 yrs.    Attending Attestation (For Attendings USE Only)...

## 2022-12-05 NOTE — ED ADULT NURSE NOTE - CAS EDN DISCHARGE ASSESSMENT
Alert and oriented to person, place and time Dupixent Counseling: I discussed with the patient the risks of dupilumab including but not limited to eye infection and irritation, cold sores, injection site reactions, worsening of asthma, allergic reactions and increased risk of parasitic infection.  Live vaccines should be avoided while taking dupilumab. Dupilumab will also interact with certain medications such as warfarin and cyclosporine. The patient understands that monitoring is required and they must alert us or the primary physician if symptoms of infection or other concerning signs are noted.

## 2023-01-26 NOTE — ED PROVIDER NOTE - SKIN [+], MLM
RASH Opioid Counseling: I discussed with the patient the potential side effects of opioids including but not limited to addiction, altered mental status, and depression. I stressed avoiding alcohol, benzodiazepines, muscle relaxants and sleep aids unless specifically okayed by a physician. The patient verbalized understanding of the proper use and possible adverse effects of opioids. All of the patient's questions and concerns were addressed. They were instructed to flush the remaining pills down the toilet if they did not need them for pain.

## 2023-03-14 NOTE — ED ADULT NURSE NOTE - TEMPLATE
Detail Level: Detailed Additional Notes: Patient declines vaccine. Quality 110: Preventive Care And Screening: Influenza Immunization: Influenza Immunization not Administered for Documented Reasons. General

## 2023-04-25 NOTE — ED ADULT TRIAGE NOTE - DOMESTIC TRAVEL HIGH RISK QUESTION
Last OV: 3/2/23  Next OV: X due 6 months  Last refill: 2/22/23  Most recent Labs: 2/22/23  Last EKG (if needed): 3/2/23
No

## 2023-05-19 NOTE — ED PROVIDER NOTE - CPE EDP RESP NORM
Current Eye Medications:  Latanoprost - both eyes at bedtime  - last dose: 7pm last night  Timolol - both eyes QAM - last dose: 7am today     Subjective:  1 mo f/u with IOP check - started Timolol - denies any issues with itching, redness or pain.  Eyes do water for the last 2 mos.    Sees Ali next week.      Objective:  See Ophthalmology Exam.       Assessment:  Good initial decrease in intraocular pressure both eyes with addition of Timolol.      Plan:  Continue same medications.  Return visit 4 months for an intraocular pressure check.  Plan complete exam 6 months thereafter (and glaucoma testing 6 months thereafter if stable.)  Maxx Roque M.D.  [phone]       
normal...

## 2023-11-09 NOTE — ED ADULT NURSE NOTE - BREATHING, MLM
1st Attempt.     Pt states she has an appointment scheduled already for 11/29/2023 @ 230.  Nothing in Epic.  Put pt on schedule for 11/29/2023 @ 240 as we had an opening.  Pt states she will be having labs done prior to appt.  Lipid, a1c, microalb, cmp pended.  Anything else?      In an effort to ensure that our patients [employer], a Team Member has reviewed your chart and identified an opportunity to provide the best care possible. An attempt was made to discuss or schedule overdue Preventive or Disease Management screening.     The Outcome was Contact was made, appointment scheduled. Care Gaps include Diabetes and Hypertension.              
Orders signed. Thanks   
Spontaneous, unlabored and symmetrical

## 2024-02-15 NOTE — ED PROVIDER NOTE - CARE PLAN
Detail Level: Simple Quality 431: Preventive Care And Screening: Unhealthy Alcohol Use - Screening: Patient not identified as an unhealthy alcohol user when screened for unhealthy alcohol use using a systematic screening method Quality 226: Preventive Care And Screening: Tobacco Use: Screening And Cessation Intervention: Patient screened for tobacco use and is an ex/non-smoker Principal Discharge DX:	Rash   1

## 2024-03-19 NOTE — ED ADULT NURSE NOTE - NS ED PATIENT SAFETY CONCERN
How Severe Is Your Scc?: mild When Was The Scc Biopsied? (Optional): 01- Accession # (Optional): [id number] No

## 2024-07-12 NOTE — ED ADULT TRIAGE NOTE - BP NONINVASIVE SYSTOLIC (MM HG)
Is This A New Presentation, Or A Follow-Up?: Nail Dystrophy Additional History: Est pt c/o an impacted toenail due to tight fitting shoes. 136

## 2024-10-25 NOTE — ED ADULT NURSE NOTE - CCCP TRG CHIEF CMPLNT
Please let patient know her pap result is ascus but the hpv is negative so we consider that to be a normal result and we will see her in 1 year for her annual visit.     If she is not on there portal, please encourage her to do so.   difficulty breathing

## 2025-01-15 NOTE — ED ADULT TRIAGE NOTE - CCCP TRG CHIEF CMPLNT
Patient Education on Sedation / Analgesia Administered for Procedure      For 24 hours after general anesthesia or intravenous analgesia / sedation:  Have someone responsible help you with your care  Limit your activities  Do not drive and operate hazardous machinery  Do not make important personal, legal or business decisions  Do not drink alcoholic beverages  If you have not urinated within 8 hours after discharge, please contact your physician  Resume your medications unless otherwise instructed    For 24 hours after general anesthesia or intravenous analgesia / sedation  you may experience:  Drowsiness, dizziness, sleepiness, or confusion  Difficulty remembering or delayed reaction times  Difficulty with your balance, especially while walking, move slowly and carefully, do not make sudden position changes  Difficulty focusing or blurred vision    You may not be aware of slight changes in your behavior and/or your reaction time because of the medication used during and after your procedure.    Report the following to your physician:  Excessive pain, swelling, redness or odor of or around the surgical area  Temperature over 100.5  Nausea and vomiting lasting longer than 4 hours or if unable to take medications  Any signs of decreased circulation or nerve impairment to extremity: change in color, persistent numbness, tingling, coldness or increase pain  Any questions or concerns    IF YOU REPORT TO AN EMERGENCY ROOM, DOCTOR'S OFFICE OR HOSPITAL WITHIN 24 HOURS AFTER YOUR PROCEDURE, BRING THIS SHEET AND YOUR AFTER VISIT SUMMARY WITH YOU AND GIVE IT TO THE PHYSICIAN OR NURSE ATTENDING YOU.   
neck pain

## 2025-02-01 NOTE — ED ADULT NURSE NOTE - CHIEF COMPLAINT QUOTE
Bleeding from gums x4days.  Pt denies sob, cough, cold like symptoms, n/v/d/f, abd pain but appears labored when speaking. Pt returned to 3CD. She is sleepy but denies pain or nausea. New JUSTEN dressing appears clean, dry, and intact.